# Patient Record
Sex: MALE | Race: WHITE | NOT HISPANIC OR LATINO | Employment: UNEMPLOYED | URBAN - METROPOLITAN AREA
[De-identification: names, ages, dates, MRNs, and addresses within clinical notes are randomized per-mention and may not be internally consistent; named-entity substitution may affect disease eponyms.]

---

## 2018-05-23 ENCOUNTER — OFFICE VISIT (OUTPATIENT)
Dept: PULMONOLOGY | Facility: CLINIC | Age: 48
End: 2018-05-23

## 2018-05-23 VITALS
HEIGHT: 68 IN | TEMPERATURE: 98.1 F | BODY MASS INDEX: 38.1 KG/M2 | SYSTOLIC BLOOD PRESSURE: 140 MMHG | RESPIRATION RATE: 18 BRPM | DIASTOLIC BLOOD PRESSURE: 90 MMHG | HEART RATE: 76 BPM | OXYGEN SATURATION: 96 % | WEIGHT: 251.38 LBS

## 2018-05-23 DIAGNOSIS — R91.8 PULMONARY NODULES: ICD-10-CM

## 2018-05-23 DIAGNOSIS — R06.02 SOB (SHORTNESS OF BREATH): Primary | ICD-10-CM

## 2018-05-23 DIAGNOSIS — J43.2 CENTRILOBULAR EMPHYSEMA (HCC): ICD-10-CM

## 2018-05-23 PROCEDURE — 99205 OFFICE O/P NEW HI 60 MIN: CPT | Performed by: INTERNAL MEDICINE

## 2018-05-23 PROCEDURE — 94729 DIFFUSING CAPACITY: CPT | Performed by: INTERNAL MEDICINE

## 2018-05-23 PROCEDURE — 94060 EVALUATION OF WHEEZING: CPT | Performed by: INTERNAL MEDICINE

## 2018-05-23 PROCEDURE — 94726 PLETHYSMOGRAPHY LUNG VOLUMES: CPT | Performed by: INTERNAL MEDICINE

## 2018-05-23 RX ORDER — ATORVASTATIN CALCIUM 20 MG/1
20 TABLET, FILM COATED ORAL DAILY
COMMUNITY
Start: 2018-03-20

## 2018-05-23 RX ORDER — ALBUTEROL SULFATE 90 UG/1
1 AEROSOL, METERED RESPIRATORY (INHALATION) EVERY 4 HOURS PRN
COMMUNITY
Start: 2018-03-20 | End: 2018-10-25 | Stop reason: SDUPTHER

## 2018-05-23 RX ORDER — ALBUTEROL SULFATE 90 UG/1
4 AEROSOL, METERED RESPIRATORY (INHALATION) ONCE
Status: COMPLETED | OUTPATIENT
Start: 2018-05-23 | End: 2018-05-23

## 2018-05-23 RX ORDER — FUROSEMIDE 20 MG/1
20 TABLET ORAL 2 TIMES DAILY PRN
COMMUNITY
Start: 2018-03-20

## 2018-05-23 RX ADMIN — ALBUTEROL SULFATE 4 PUFF: 90 AEROSOL, METERED RESPIRATORY (INHALATION) at 12:57

## 2018-10-25 ENCOUNTER — OFFICE VISIT (OUTPATIENT)
Dept: PULMONOLOGY | Facility: CLINIC | Age: 48
End: 2018-10-25

## 2018-10-25 VITALS
BODY MASS INDEX: 37.93 KG/M2 | SYSTOLIC BLOOD PRESSURE: 122 MMHG | DIASTOLIC BLOOD PRESSURE: 76 MMHG | RESPIRATION RATE: 18 BRPM | HEART RATE: 92 BPM | OXYGEN SATURATION: 95 % | HEIGHT: 68 IN | TEMPERATURE: 98.2 F | WEIGHT: 250.25 LBS

## 2018-10-25 DIAGNOSIS — R91.8 MULTIPLE PULMONARY NODULES: Primary | ICD-10-CM

## 2018-10-25 DIAGNOSIS — Z23 IMMUNIZATION DUE: ICD-10-CM

## 2018-10-25 PROCEDURE — 90471 IMMUNIZATION ADMIN: CPT | Performed by: INTERNAL MEDICINE

## 2018-10-25 PROCEDURE — 90674 CCIIV4 VAC NO PRSV 0.5 ML IM: CPT | Performed by: INTERNAL MEDICINE

## 2018-10-25 PROCEDURE — 99214 OFFICE O/P EST MOD 30 MIN: CPT | Performed by: INTERNAL MEDICINE

## 2018-10-25 RX ORDER — ALBUTEROL SULFATE 90 UG/1
2 AEROSOL, METERED RESPIRATORY (INHALATION) EVERY 4 HOURS PRN
Qty: 1 INHALER | Refills: 11 | Status: SHIPPED | OUTPATIENT
Start: 2018-10-25 | End: 2019-04-11 | Stop reason: SDUPTHER

## 2018-10-25 RX ORDER — TRAMADOL HYDROCHLORIDE 50 MG/1
50 TABLET ORAL EVERY 8 HOURS PRN
COMMUNITY
Start: 2018-08-27

## 2018-10-25 NOTE — PROGRESS NOTES
CC:    Lung nodules / COPD    HPI:    47 y/o WM w/ h/o Tobacco abuse 52 py - quit 2017, COPD, PEDRO not started on CPAP yet, who had a near syncopal episode dyspnea in November 2017.  He had a cardiac evaluation and CT PE protocol.  CT revealed evidence of granulomatous disease with several scattered sub-centimeter nodules.  He also had a sleep study but hasn't started CPAP yet.  He does complain of FERNANDO with moderate activity.  Not much improvement with Anoro or now currently Breo.  No h/o Asthma or allergies.  Cardiac evaluation was reportedly ok.  No other complaints.    INTERVAL HISTORY:    Patient returns for follow up.  Doing well.  He is taking Breo. Didn't get the stiolto.  Rarely has to use rescue inhaler.  Still tobacco free.  Using CPAP.    PMH:    Past Medical History:   Diagnosis Date   • COPD (chronic obstructive pulmonary disease) (CMS/HCC)    • Sleep apnea    • Syncope    • Tachycardia      PSH:    Past Surgical History:   Procedure Laterality Date   • AVULSION TOENAIL PLATE     • CARPAL TUNNEL RELEASE     • PLANTAR FASCIA SURGERY       FH:    Family History   Problem Relation Age of Onset   • Diabetes Mother    • COPD Father    • Diabetes Father    • Heart disease Brother    • Heart disease Maternal Grandmother    • Cancer Maternal Grandfather      SH:    Social History     Social History   • Marital status:      Spouse name: N/A   • Number of children: N/A   • Years of education: N/A     Occupational History   • Not on file.     Social History Main Topics   • Smoking status: Former Smoker     Quit date: 11/23/2017   • Smokeless tobacco: Never Used   • Alcohol use 0.6 oz/week     1 Cans of beer per week      Comment: occ   • Drug use: No   • Sexual activity: Defer     Other Topics Concern   • Not on file     Social History Narrative   • No narrative on file     ALLERGIES:    Allergies   Allergen Reactions   • Penicillins Unknown (See Comments)     Not sure     MEDICATIONS:      Current  Outpatient Prescriptions:   •  atorvastatin (LIPITOR) 20 MG tablet, Take 20 mg by mouth Daily., Disp: , Rfl:   •  BREO ELLIPTA 100-25 MCG/INH aerosol powder , Inhale 1 puff Daily., Disp: , Rfl:   •  furosemide (LASIX) 20 MG tablet, Take 20 mg by mouth 2 (Two) Times a Day As Needed., Disp: , Rfl:   •  metoprolol tartrate (LOPRESSOR) 25 MG tablet, Take 25 mg by mouth Every 12 (Twelve) Hours., Disp: , Rfl:   •  tiotropium bromide-olodaterol (STIOLTO RESPIMAT) 2.5-2.5 MCG/ACT aerosol solution inhaler, Inhale 2 puffs Daily., Disp: 1 inhaler, Rfl: 11  •  traMADol (ULTRAM) 50 MG tablet, Take 50 mg by mouth Every 8 (Eight) Hours As Needed., Disp: , Rfl:   •  VENTOLIN  (90 Base) MCG/ACT inhaler, Inhale 1 puff Every 4 (Four) Hours As Needed., Disp: , Rfl:   ROS:  Per HPI, otherwise all systems reviewed and negative.    DIAGNOSTIC DATA (Reviewed and interpreted by me unless otherwise specified):    CT Chest 3/2018 - emphysema, scattered sub-centimeter nodules, larges of which was 7 mm, unchanged from 11/2017.  Appearance most likely c/w histo.    PFT 5/23/18 - moderate obstruction, no change w/ BD, mod reduction in DLCO partially corrects    Vitals:    10/25/18 1203   BP: 122/76   Pulse: 92   Resp: 18   Temp: 98.2 °F (36.8 °C)   SpO2: 95%       Physical Exam   Constitutional: Oriented to person, place, and time. Appears well-developed and well-nourished.   Head: Normocephalic and atraumatic.   Nose: Nose normal.   Mouth/Throat: Oropharynx is clear and moist.   Eyes: Conjunctivae are normal.  Pupils normal.  Neck: No tracheal deviation present.   Cardiovascular: Normal rate, regular rhythm, normal heart sounds and intact distal pulses.  Exam reveals no gallop and no friction rub.  No thrill.  No JVD.  No edema.  No murmur heard.  Pulmonary/Chest: Effort normal and breath sounds normal.  No tenderness to palpation.  No clubbing.   Abdominal: Soft. Bowel sounds are normal. No distension. No tenderness. There is no  guarding.   Musculoskeletal: Normal range of motion.  No tenderness.  Lymphadenopathy:  No cervical adenopathy.   Neurological:  No new focal neurological deficits observed   Skin: Skin is warm and dry. No rash noted.   Psychiatric: Normal mood and affect.  Behavior is normal. Judgment normal.    Assessment/Plan     1)  GOLD 2A COPD - get rid of Breo as he doesn't need ICS.  Will give sample of Anoro and Stiolto.  He can use whichever he likes best or is cheapest.  Will put in prescription for both. Prn DAVID.  Already quit smoking.  Encouraged weight loss.    2)  Lung Nodules - repeat CT in 3/2019    RTC 6 months    Denilson Randle MD  Pulmonology and Critical Care Medicine  10/25/18 3:34 PM  Electronically Signed    C.C.:  No ref. provider found, Ronal Joe APRN

## 2019-03-11 ENCOUNTER — HOSPITAL ENCOUNTER (OUTPATIENT)
Dept: CT IMAGING | Facility: HOSPITAL | Age: 49
Discharge: HOME | End: 2019-03-11
Admitting: INTERNAL MEDICINE

## 2019-03-11 DIAGNOSIS — R91.8 MULTIPLE PULMONARY NODULES: ICD-10-CM

## 2019-03-11 PROCEDURE — 71250 CT THORAX DX C-: CPT

## 2019-04-11 ENCOUNTER — OFFICE VISIT (OUTPATIENT)
Dept: PULMONOLOGY | Facility: CLINIC | Age: 49
End: 2019-04-11

## 2019-04-11 VITALS
DIASTOLIC BLOOD PRESSURE: 90 MMHG | RESPIRATION RATE: 19 BRPM | HEIGHT: 68 IN | TEMPERATURE: 98.2 F | WEIGHT: 254 LBS | BODY MASS INDEX: 38.49 KG/M2 | SYSTOLIC BLOOD PRESSURE: 128 MMHG | OXYGEN SATURATION: 96 % | HEART RATE: 99 BPM

## 2019-04-11 DIAGNOSIS — R91.1 LUNG NODULE: Primary | ICD-10-CM

## 2019-04-11 PROCEDURE — 99214 OFFICE O/P EST MOD 30 MIN: CPT | Performed by: INTERNAL MEDICINE

## 2019-04-11 RX ORDER — ALBUTEROL SULFATE 90 UG/1
2 AEROSOL, METERED RESPIRATORY (INHALATION) EVERY 4 HOURS PRN
Qty: 1 INHALER | Refills: 11 | Status: SHIPPED | OUTPATIENT
Start: 2019-04-11

## 2019-04-11 RX ORDER — UBIDECARENONE 100 MG
100 CAPSULE ORAL DAILY
COMMUNITY

## 2019-04-11 NOTE — PROGRESS NOTES
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  He had a cardiac evaluation and CT PE protocol.  CT revealed evidence of granulomatous disease with several scattered sub-centimeter nodules.  He also had a sleep study but hasn't started CPAP yet.  He does complain of FERNANDO with moderate activity.  Not much improvement with Anoro or now currently Breo.  No h/o Asthma or allergies.  Cardiac evaluation was reportedly ok.  No other complaints.    INTERVAL HISTORY:    Patient returns for follow up.  Doing well.  He is taking Stiolto and doing fine with it.  Rarely has to use rescue inhaler.  Still tobacco free.  Using CPAP.    PMH:    Past Medical History:   Diagnosis Date   • COPD (chronic obstructive pulmonary disease) (CMS/HCC)    • Sleep apnea    • Syncope    • Tachycardia      PSH:    Past Surgical History:   Procedure Laterality Date   • AVULSION TOENAIL PLATE     • CARPAL TUNNEL RELEASE     • PLANTAR FASCIA SURGERY       FH:    Family History   Problem Relation Age of Onset   • Diabetes Mother    • COPD Father    • Diabetes Father    • Heart disease Brother    • Heart disease Maternal Grandmother    • Cancer Maternal Grandfather      SH:    Social History     Socioeconomic History   • Marital status:      Spouse name: Not on file   • Number of children: Not on file   • Years of education: Not on file   • Highest education level: Not on file   Tobacco Use   • Smoking status: Former Smoker     Last attempt to quit: 2017     Years since quittin.3   • Smokeless tobacco: Never Used   Substance and Sexual Activity   • Alcohol use: Yes     Alcohol/week: 0.6 oz     Types: 1 Cans of beer per week     Comment: occ   • Drug use: No   • Sexual activity: Defer     ALLERGIES:    Allergies   Allergen Reactions   • Penicillins Unknown (See Comments)     Not sure      MEDICATIONS:      Current Outpatient Medications:   •  atorvastatin (LIPITOR) 20 MG tablet, Take 20 mg by mouth Daily., Disp: , Rfl:   •  coenzyme Q10 100 MG capsule, Take 100 mg by mouth Daily., Disp: , Rfl:   •  furosemide (LASIX) 20 MG tablet, Take 20 mg by mouth 2 (Two) Times a Day As Needed., Disp: , Rfl:   •  metoprolol tartrate (LOPRESSOR) 25 MG tablet, Take 25 mg by mouth Every 12 (Twelve) Hours., Disp: , Rfl:   •  tiotropium bromide-olodaterol (STIOLTO RESPIMAT) 2.5-2.5 MCG/ACT aerosol solution inhaler, Inhale 2 puffs Daily., Disp: 1 inhaler, Rfl: 11  •  traMADol (ULTRAM) 50 MG tablet, Take 50 mg by mouth Every 8 (Eight) Hours As Needed., Disp: , Rfl:   •  VENTOLIN  (90 Base) MCG/ACT inhaler, Inhale 2 puffs Every 4 (Four) Hours As Needed for Wheezing or Shortness of Air., Disp: 1 inhaler, Rfl: 11  ROS:  Per HPI, otherwise all systems reviewed and negative.    DIAGNOSTIC DATA (Reviewed and interpreted by me unless otherwise specified):    CT Chest 3/2018 - emphysema, scattered sub-centimeter nodules, larges of which was 7 mm, unchanged from 11/2017.  Appearance most likely c/w histo.    CT Chest 3/11/19 - emphysema, scattered sub-centimeter nodules, larges of which was 7 mm, unchanged from 11/2017.  Appearance most likely c/w histo.    PFT 5/23/18 - moderate obstruction, no change w/ BD, mod reduction in DLCO partially corrects    Vitals:    04/11/19 1038   BP: 128/90   Pulse: 99   Resp: 19   Temp: 98.2 °F (36.8 °C)   SpO2: 96%       Physical Exam   Constitutional: Oriented to person, place, and time. Appears well-developed and well-nourished.   Head: Normocephalic and atraumatic.   Nose: Nose normal.   Mouth/Throat: Oropharynx is clear and moist.   Eyes: Conjunctivae are normal.  Pupils normal.  Neck: No tracheal deviation present.   Cardiovascular: Normal rate, regular rhythm, normal heart sounds and intact distal pulses.  Exam reveals no gallop and no friction rub.  No thrill.  No JVD.  No  edema.  No murmur heard.  Pulmonary/Chest: Effort normal and breath sounds normal.  No tenderness to palpation.  No clubbing.   Abdominal: Soft. Bowel sounds are normal. No distension. No tenderness. There is no guarding.   Musculoskeletal: Normal range of motion.  No tenderness.  Lymphadenopathy:  No cervical adenopathy.   Neurological:  No new focal neurological deficits observed   Skin: Skin is warm and dry. No rash noted.   Psychiatric: Normal mood and affect.  Behavior is normal. Judgment normal.    Assessment/Plan     1)  GOLD 2A COPD - Continue stiolto.  Will put in prescription for both. Prn DAVID.  Already quit smoking.      2)  Lung Nodules - repeat CT in 3/2019.  If stable no more CT scans until eligible for LDCT for lung cancer screening at age 53.    RTC 1 year    Denilson Randle MD  Pulmonology and Critical Care Medicine  04/11/19 11:06 AM  Electronically Signed    C.C.:  WILBER King, Ronal Joe APRN

## 2020-02-20 ENCOUNTER — HOSPITAL ENCOUNTER (OUTPATIENT)
Dept: CT IMAGING | Facility: HOSPITAL | Age: 50
Discharge: HOME | End: 2020-02-20
Admitting: INTERNAL MEDICINE

## 2020-02-20 DIAGNOSIS — R91.1 LUNG NODULE: ICD-10-CM

## 2020-02-20 PROCEDURE — 71250 CT THORAX DX C-: CPT

## 2024-08-15 ENCOUNTER — HOSPITAL ENCOUNTER (INPATIENT)
Facility: HOSPITAL | Age: 54
LOS: 3 days | Discharge: HOME | DRG: 390 | End: 2024-08-18
Attending: STUDENT IN AN ORGANIZED HEALTH CARE EDUCATION/TRAINING PROGRAM | Admitting: INTERNAL MEDICINE
Payer: MEDICAID

## 2024-08-15 ENCOUNTER — APPOINTMENT (OUTPATIENT)
Facility: HOSPITAL | Age: 54
DRG: 390 | End: 2024-08-15
Payer: MEDICAID

## 2024-08-15 DIAGNOSIS — R10.9 ABDOMINAL PAIN, UNSPECIFIED ABDOMINAL LOCATION: ICD-10-CM

## 2024-08-15 DIAGNOSIS — R91.1 PULMONARY NODULE: Primary | ICD-10-CM

## 2024-08-15 PROBLEM — E66.09 CLASS 2 OBESITY DUE TO EXCESS CALORIES WITH BODY MASS INDEX (BMI) OF 36.0 TO 36.9 IN ADULT: Status: ACTIVE | Noted: 2024-08-15

## 2024-08-15 PROBLEM — J44.9 COPD (CHRONIC OBSTRUCTIVE PULMONARY DISEASE): Status: ACTIVE | Noted: 2024-08-15

## 2024-08-15 PROBLEM — E66.812 CLASS 2 OBESITY DUE TO EXCESS CALORIES WITH BODY MASS INDEX (BMI) OF 36.0 TO 36.9 IN ADULT: Status: ACTIVE | Noted: 2024-08-15

## 2024-08-15 PROBLEM — K56.7 ILEUS: Status: ACTIVE | Noted: 2024-08-15

## 2024-08-15 PROBLEM — G47.33 OSA (OBSTRUCTIVE SLEEP APNEA): Status: ACTIVE | Noted: 2024-08-15

## 2024-08-15 LAB
ALBUMIN SERPL-MCNC: 4.4 G/DL (ref 3.5–5.2)
ALBUMIN/GLOB SERPL: 1.3 G/DL
ALP SERPL-CCNC: 69 U/L (ref 39–117)
ALT SERPL W P-5'-P-CCNC: 20 U/L (ref 1–41)
ANION GAP SERPL CALCULATED.3IONS-SCNC: 10.4 MMOL/L (ref 5–15)
AST SERPL-CCNC: 31 U/L (ref 1–40)
BASOPHILS # BLD AUTO: 0.04 10*3/MM3 (ref 0–0.2)
BASOPHILS NFR BLD AUTO: 0.4 % (ref 0–1.5)
BILIRUB SERPL-MCNC: 1.8 MG/DL (ref 0–1.2)
BILIRUB UR QL STRIP: ABNORMAL
BUN SERPL-MCNC: 13 MG/DL (ref 6–20)
BUN/CREAT SERPL: 13 (ref 7–25)
CALCIUM SPEC-SCNC: 9.2 MG/DL (ref 8.6–10.5)
CHLORIDE SERPL-SCNC: 101 MMOL/L (ref 98–107)
CLARITY UR: CLEAR
CO2 SERPL-SCNC: 25.6 MMOL/L (ref 22–29)
COLOR UR: ABNORMAL
CREAT SERPL-MCNC: 1 MG/DL (ref 0.76–1.27)
DEPRECATED RDW RBC AUTO: 42.4 FL (ref 37–54)
EGFRCR SERPLBLD CKD-EPI 2021: 88.3 ML/MIN/1.73
EOSINOPHIL # BLD AUTO: 0.16 10*3/MM3 (ref 0–0.4)
EOSINOPHIL NFR BLD AUTO: 1.6 % (ref 0.3–6.2)
ERYTHROCYTE [DISTWIDTH] IN BLOOD BY AUTOMATED COUNT: 12.4 % (ref 12.3–15.4)
GLOBULIN UR ELPH-MCNC: 3.5 GM/DL
GLUCOSE SERPL-MCNC: 110 MG/DL (ref 65–99)
GLUCOSE UR STRIP-MCNC: NEGATIVE MG/DL
HCT VFR BLD AUTO: 47.3 % (ref 37.5–51)
HGB BLD-MCNC: 16.2 G/DL (ref 13–17.7)
HGB UR QL STRIP.AUTO: NEGATIVE
HOLD SPECIMEN: NORMAL
IMM GRANULOCYTES # BLD AUTO: 0.02 10*3/MM3 (ref 0–0.05)
IMM GRANULOCYTES NFR BLD AUTO: 0.2 % (ref 0–0.5)
KETONES UR QL STRIP: ABNORMAL
LEUKOCYTE ESTERASE UR QL STRIP.AUTO: NEGATIVE
LIPASE SERPL-CCNC: 22 U/L (ref 13–60)
LYMPHOCYTES # BLD AUTO: 1.46 10*3/MM3 (ref 0.7–3.1)
LYMPHOCYTES NFR BLD AUTO: 14.2 % (ref 19.6–45.3)
MCH RBC QN AUTO: 31.2 PG (ref 26.6–33)
MCHC RBC AUTO-ENTMCNC: 34.2 G/DL (ref 31.5–35.7)
MCV RBC AUTO: 91.1 FL (ref 79–97)
MONOCYTES # BLD AUTO: 0.71 10*3/MM3 (ref 0.1–0.9)
MONOCYTES NFR BLD AUTO: 6.9 % (ref 5–12)
NEUTROPHILS NFR BLD AUTO: 7.87 10*3/MM3 (ref 1.7–7)
NEUTROPHILS NFR BLD AUTO: 76.7 % (ref 42.7–76)
NITRITE UR QL STRIP: NEGATIVE
PH UR STRIP.AUTO: 6 [PH] (ref 5–8)
PLATELET # BLD AUTO: 218 10*3/MM3 (ref 140–450)
PMV BLD AUTO: 11.2 FL (ref 6–12)
POTASSIUM SERPL-SCNC: 3.9 MMOL/L (ref 3.5–5.2)
PROT SERPL-MCNC: 7.9 G/DL (ref 6–8.5)
PROT UR QL STRIP: ABNORMAL
RBC # BLD AUTO: 5.19 10*6/MM3 (ref 4.14–5.8)
SODIUM SERPL-SCNC: 137 MMOL/L (ref 136–145)
SP GR UR STRIP: >=1.03 (ref 1–1.03)
TROPONIN T SERPL HS-MCNC: 14 NG/L
UROBILINOGEN UR QL STRIP: ABNORMAL
WBC NRBC COR # BLD AUTO: 10.26 10*3/MM3 (ref 3.4–10.8)
WHOLE BLOOD HOLD COAG: NORMAL
WHOLE BLOOD HOLD SPECIMEN: NORMAL

## 2024-08-15 PROCEDURE — 25010000002 ONDANSETRON PER 1 MG: Performed by: STUDENT IN AN ORGANIZED HEALTH CARE EDUCATION/TRAINING PROGRAM

## 2024-08-15 PROCEDURE — 25510000001 IOPAMIDOL 61 % SOLUTION: Performed by: STUDENT IN AN ORGANIZED HEALTH CARE EDUCATION/TRAINING PROGRAM

## 2024-08-15 PROCEDURE — 99285 EMERGENCY DEPT VISIT HI MDM: CPT

## 2024-08-15 PROCEDURE — 85025 COMPLETE CBC W/AUTO DIFF WBC: CPT | Performed by: STUDENT IN AN ORGANIZED HEALTH CARE EDUCATION/TRAINING PROGRAM

## 2024-08-15 PROCEDURE — 81003 URINALYSIS AUTO W/O SCOPE: CPT | Performed by: STUDENT IN AN ORGANIZED HEALTH CARE EDUCATION/TRAINING PROGRAM

## 2024-08-15 PROCEDURE — 71045 X-RAY EXAM CHEST 1 VIEW: CPT

## 2024-08-15 PROCEDURE — 93005 ELECTROCARDIOGRAM TRACING: CPT | Performed by: STUDENT IN AN ORGANIZED HEALTH CARE EDUCATION/TRAINING PROGRAM

## 2024-08-15 PROCEDURE — 99222 1ST HOSP IP/OBS MODERATE 55: CPT | Performed by: INTERNAL MEDICINE

## 2024-08-15 PROCEDURE — 25810000003 SODIUM CHLORIDE 0.9 % SOLUTION: Performed by: STUDENT IN AN ORGANIZED HEALTH CARE EDUCATION/TRAINING PROGRAM

## 2024-08-15 PROCEDURE — 84484 ASSAY OF TROPONIN QUANT: CPT | Performed by: STUDENT IN AN ORGANIZED HEALTH CARE EDUCATION/TRAINING PROGRAM

## 2024-08-15 PROCEDURE — 74177 CT ABD & PELVIS W/CONTRAST: CPT

## 2024-08-15 PROCEDURE — 83690 ASSAY OF LIPASE: CPT | Performed by: STUDENT IN AN ORGANIZED HEALTH CARE EDUCATION/TRAINING PROGRAM

## 2024-08-15 PROCEDURE — 71275 CT ANGIOGRAPHY CHEST: CPT

## 2024-08-15 PROCEDURE — 80053 COMPREHEN METABOLIC PANEL: CPT | Performed by: STUDENT IN AN ORGANIZED HEALTH CARE EDUCATION/TRAINING PROGRAM

## 2024-08-15 PROCEDURE — 25010000002 MORPHINE PER 10 MG: Performed by: STUDENT IN AN ORGANIZED HEALTH CARE EDUCATION/TRAINING PROGRAM

## 2024-08-15 PROCEDURE — 25010000002 MORPHINE PER 10 MG: Performed by: INTERNAL MEDICINE

## 2024-08-15 RX ORDER — HEPARIN SODIUM 5000 [USP'U]/ML
5000 INJECTION, SOLUTION INTRAVENOUS; SUBCUTANEOUS EVERY 8 HOURS SCHEDULED
Status: DISCONTINUED | OUTPATIENT
Start: 2024-08-15 | End: 2024-08-18 | Stop reason: HOSPADM

## 2024-08-15 RX ORDER — MORPHINE SULFATE 2 MG/ML
2 INJECTION, SOLUTION INTRAMUSCULAR; INTRAVENOUS
Status: DISCONTINUED | OUTPATIENT
Start: 2024-08-15 | End: 2024-08-18 | Stop reason: HOSPADM

## 2024-08-15 RX ORDER — IPRATROPIUM BROMIDE AND ALBUTEROL SULFATE 2.5; .5 MG/3ML; MG/3ML
3 SOLUTION RESPIRATORY (INHALATION) EVERY 4 HOURS PRN
Status: DISCONTINUED | OUTPATIENT
Start: 2024-08-15 | End: 2024-08-18 | Stop reason: HOSPADM

## 2024-08-15 RX ORDER — SODIUM CHLORIDE 0.9 % (FLUSH) 0.9 %
10 SYRINGE (ML) INJECTION AS NEEDED
Status: DISCONTINUED | OUTPATIENT
Start: 2024-08-15 | End: 2024-08-18 | Stop reason: HOSPADM

## 2024-08-15 RX ORDER — DEXTROSE MONOHYDRATE, SODIUM CHLORIDE, AND POTASSIUM CHLORIDE 50; 1.49; 4.5 G/1000ML; G/1000ML; G/1000ML
100 INJECTION, SOLUTION INTRAVENOUS CONTINUOUS
Status: DISCONTINUED | OUTPATIENT
Start: 2024-08-15 | End: 2024-08-18 | Stop reason: HOSPADM

## 2024-08-15 RX ORDER — ONDANSETRON 2 MG/ML
4 INJECTION INTRAMUSCULAR; INTRAVENOUS ONCE
Status: COMPLETED | OUTPATIENT
Start: 2024-08-15 | End: 2024-08-15

## 2024-08-15 RX ORDER — ONDANSETRON 2 MG/ML
4 INJECTION INTRAMUSCULAR; INTRAVENOUS EVERY 6 HOURS PRN
Status: DISCONTINUED | OUTPATIENT
Start: 2024-08-15 | End: 2024-08-18 | Stop reason: HOSPADM

## 2024-08-15 RX ADMIN — METOPROLOL TARTRATE 25 MG: 25 TABLET, FILM COATED ORAL at 22:05

## 2024-08-15 RX ADMIN — ONDANSETRON 4 MG: 2 INJECTION INTRAMUSCULAR; INTRAVENOUS at 10:07

## 2024-08-15 RX ADMIN — IOPAMIDOL 90 ML: 612 INJECTION, SOLUTION INTRAVENOUS at 11:04

## 2024-08-15 RX ADMIN — MORPHINE SULFATE 2 MG: 2 INJECTION, SOLUTION INTRAMUSCULAR; INTRAVENOUS at 20:24

## 2024-08-15 RX ADMIN — POTASSIUM CHLORIDE, DEXTROSE MONOHYDRATE AND SODIUM CHLORIDE 100 ML/HR: 150; 5; 450 INJECTION, SOLUTION INTRAVENOUS at 20:03

## 2024-08-15 RX ADMIN — ONDANSETRON 4 MG: 2 INJECTION INTRAMUSCULAR; INTRAVENOUS at 18:37

## 2024-08-15 RX ADMIN — SODIUM CHLORIDE 500 ML: 9 INJECTION, SOLUTION INTRAVENOUS at 10:07

## 2024-08-15 RX ADMIN — MORPHINE SULFATE 4 MG: 4 INJECTION, SOLUTION INTRAMUSCULAR; INTRAVENOUS at 10:09

## 2024-08-15 NOTE — PLAN OF CARE
Problem: Adult Inpatient Plan of Care  Goal: Plan of Care Review  Outcome: Ongoing, Progressing  Goal: Patient-Specific Goal (Individualized)  Outcome: Ongoing, Progressing  Goal: Absence of Hospital-Acquired Illness or Injury  Outcome: Ongoing, Progressing  Goal: Optimal Comfort and Wellbeing  Outcome: Ongoing, Progressing  Goal: Readiness for Transition of Care  Outcome: Ongoing, Progressing  Intervention: Mutually Develop Transition Plan  Recent Flowsheet Documentation  Taken 8/15/2024 1810 by Radha Hammond RN  Equipment Currently Used at Home: none  Transportation Anticipated: family or friend will provide  Patient/Family Anticipated Services at Transition: none  Patient/Family Anticipates Transition to: home with family     Problem: Pain Acute  Goal: Acceptable Pain Control and Functional Ability  Outcome: Ongoing, Progressing     Problem: Infection  Goal: Absence of Infection Signs and Symptoms  Outcome: Ongoing, Progressing   Goal Outcome Evaluation:

## 2024-08-15 NOTE — ED NOTES
Francisco Javier Ortega    Nursing Report ED to Floor:  Mental status: A&O x4, GCS 15  Ambulatory status: WNL  Oxygen Therapy:  2L NC for comfort DT distended belly  Cardiac Rhythm: SR-ST  Admitted from: Home, ED [facility]  Safety Concerns:  NA  Social Issues: NA  ED Room #:  5    ED Nurse Phone [id number] [phone] or may call [phone].      HPI:   Chief Complaint   Patient presents with    Abdominal Pain       Past Medical History:  Past Medical History:   Diagnosis Date    COPD (chronic obstructive pulmonary disease)     Sleep apnea     Syncope     Tachycardia         Past Surgical History:  Past Surgical History:   Procedure Laterality Date    AVULSION TOENAIL PLATE      CARPAL TUNNEL RELEASE      PLANTAR FASCIA SURGERY          Admitting Doctor:   No admitting provider for patient encounter.    Consulting Provider(s):  Consults       No orders found from 7/17/2024 to 8/16/2024.             Admitting Diagnosis:   The encounter diagnosis was Abdominal pain, unspecified abdominal location.    Most Recent Vitals:   Vitals:    08/15/24 1230 08/15/24 1400 08/15/24 1430 08/15/24 1510   BP: 124/86 128/92 126/83    BP Location:       Patient Position:       Pulse: 81 87 84 82   Resp:       Temp:       TempSrc:       SpO2: 98% 96% 96% 95%   Weight:       Height:           Active LDAs/IV Access:   Lines, Drains & Airways       Active LDAs       Name Placement date Placement time Site Days    Peripheral IV 08/15/24 0953 Right Antecubital 08/15/24  0953  Antecubital  less than 1                    Labs (abnormal labs have a star):   Labs Reviewed   COMPREHENSIVE METABOLIC PANEL - Abnormal; Notable for the following components:       Result Value    Glucose 110 (*)     Total Bilirubin 1.8 (*)     All other components within normal limits    Narrative:     GFR Normal >60  Chronic Kidney Disease <60  Kidney Failure <15     URINALYSIS W/ MICROSCOPIC IF INDICATED (NO CULTURE) - Abnormal; Notable for the following components:    Color, UA  Dark Yellow (*)     Ketones, UA 15 mg/dL (1+) (*)     Bilirubin, UA Moderate (2+) (*)     Protein, UA Trace (*)     Urobilinogen, UA 4.0 E.U./dL (*)     All other components within normal limits    Narrative:     Urine microscopic not indicated.   CBC WITH AUTO DIFFERENTIAL - Abnormal; Notable for the following components:    Neutrophil % 76.7 (*)     Lymphocyte % 14.2 (*)     Neutrophils, Absolute 7.87 (*)     All other components within normal limits   LIPASE - Normal   SINGLE HS TROPONIN T - Normal   RAINBOW DRAW    Narrative:     The following orders were created for panel order [place] Draw.  Procedure                               Abnormality         Status                     ---------                               -----------         ------                     Green Top (Gel)[[id number]]                                  Final result               Lavender Top[[id number]]                                     Final result               Gold Top - SST[[id number]]                                   Final result               [place] Top[[id number]]                                         Final result               Light Blue Top[[id number]]                                   Final result                 Please view results for these tests on the individual orders.   CBC AND DIFFERENTIAL    Narrative:     The following orders were created for panel order CBC & Differential.  Procedure                               Abnormality         Status                     ---------                               -----------         ------                     CBC Auto Differential[[id number]]        Abnormal            Final result                 Please view results for these tests on the individual orders.   GREEN TOP   LAVENDER TOP   GOLD TOP - SST   GRAY TOP   LIGHT BLUE TOP       Meds Given in ED:   Medications   sodium chloride 0.9 % flush 10 mL (has no administration in time range)   sodium chloride 0.9 % bolus 500 mL (0 mL  Intravenous Stopped 8/15/24 1053)   ondansetron (ZOFRAN) injection 4 mg (4 mg Intravenous Given 8/15/24 1007)   morphine injection 4 mg (4 mg Intravenous Given 8/15/24 1009)   iopamidol (ISOVUE-300) 61 % injection 100 mL (90 mL Intravenous Given 8/15/24 1104)           Last NIH score:                                                          Dysphagia screening results:        [place] Coma Scale:  No data recorded     CIWA:        Restraint Type:            Isolation Status:  No active isolations

## 2024-08-15 NOTE — FSED PROVIDER NOTE
"Subjective  History of Present Illness:    54 year old male hx of COPD who presents with epigastric abdominal pain x 3 days. He states he had NVD two days ago but that has resolved. He is currently on mounjara. He had chinese food a few days ago and is unsure if he got food poisoning. He denies fevers, hx of PUD, hx of CAD      Nurses Notes reviewed and agree, including vitals, allergies, social history and prior medical history.     REVIEW OF SYSTEMS: All systems reviewed and not pertinent unless noted.  Review of Systems   All other systems reviewed and are negative.      Past Medical History:   Diagnosis Date    COPD (chronic obstructive pulmonary disease)     Sleep apnea     Syncope     Tachycardia        Allergies:    Penicillins      Past Surgical History:   Procedure Laterality Date    AVULSION TOENAIL PLATE      CARPAL TUNNEL RELEASE      PLANTAR FASCIA SURGERY           Social History     Socioeconomic History    Marital status:    Tobacco Use    Smoking status: Former     Current packs/day: 0.00     Types: Cigarettes     Quit date: 2017     Years since quittin.7    Smokeless tobacco: Never   Substance and Sexual Activity    Alcohol use: Yes     Alcohol/week: 1.0 standard drink of alcohol     Types: 1 Cans of beer per week     Comment: occ    Drug use: No    Sexual activity: Defer         Family History   Problem Relation Age of Onset    Diabetes Mother     COPD Father     Diabetes Father     Heart disease Brother     Heart disease Maternal Grandmother     Cancer Maternal Grandfather        Objective  Physical Exam:  /86   Pulse 81   Temp 98 °F (36.7 °C) (Oral)   Resp 16   Ht 172.7 cm (68\")   Wt 110 kg (243 lb)   SpO2 98%   BMI 36.95 kg/m²      Physical Exam  Constitutional:       Appearance: Normal appearance.   HENT:      Head: Normocephalic and atraumatic.      Nose: Nose normal.      Mouth/Throat:      Mouth: Mucous membranes are moist.   Eyes:      Extraocular Movements: " Extraocular movements intact.      Conjunctiva/sclera: Conjunctivae normal.   Cardiovascular:      Rate and Rhythm: Normal rate and regular rhythm.   Pulmonary:      Effort: Pulmonary effort is normal. No respiratory distress.   Abdominal:      General: Abdomen is flat. There is no distension.      Palpations: Abdomen is soft.      Tenderness: There is no abdominal tenderness. There is no guarding or rebound.      Comments: Atraumatic    Musculoskeletal:         General: Normal range of motion.      Cervical back: Normal range of motion and neck supple.   Skin:     General: Skin is warm and dry.   Neurological:      General: No focal deficit present.      Mental Status: He is alert.      Comments: Moving all extremities spontaneously    Psychiatric:         Mood and Affect: Mood normal.         Behavior: Behavior normal.         Procedures    ED Course:         Lab Results (last 24 hours)       Procedure Component Value Units Date/Time    CBC & Differential [[id number]]  (Abnormal) Collected: 08/15/24 0948    Specimen: Blood Updated: 08/15/24 1002    Narrative:      The following orders were created for panel order CBC & Differential.  Procedure                               Abnormality         Status                     ---------                               -----------         ------                     CBC Auto Differential[[id number]]        Abnormal            Final result                 Please view results for these tests on the individual orders.    Comprehensive Metabolic Panel [[id number]]  (Abnormal) Collected: 08/15/24 0948    Specimen: Blood Updated: 08/15/24 1015     Glucose 110 mg/dL      BUN 13 mg/dL      Creatinine 1.00 mg/dL      Sodium 137 mmol/L      Potassium 3.9 mmol/L      Chloride 101 mmol/L      CO2 25.6 mmol/L      Calcium 9.2 mg/dL      Total Protein 7.9 g/dL      Albumin 4.4 g/dL      ALT (SGPT) 20 U/L      AST (SGOT) 31 U/L      Alkaline Phosphatase 69 U/L      Total Bilirubin 1.8 mg/dL       Globulin 3.5 gm/dL      A/G Ratio 1.3 g/dL      BUN/Creatinine Ratio 13.0     Anion Gap 10.4 mmol/L      eGFR 88.3 mL/min/1.73     Narrative:      GFR Normal >60  Chronic Kidney Disease <60  Kidney Failure <15      Lipase [[id number]]  (Normal) Collected: 08/15/24 0948    Specimen: Blood Updated: 08/15/24 1015     Lipase 22 U/L     Single High Sensitivity Troponin T [[id number]]  (Normal) Collected: 08/15/24 0948    Specimen: Blood Updated: 08/15/24 1012     HS Troponin T 14 ng/L     CBC Auto Differential [[id number]]  (Abnormal) Collected: 08/15/24 0948    Specimen: Blood Updated: 08/15/24 1002     WBC 10.26 10*3/mm3      RBC 5.19 10*6/mm3      Hemoglobin 16.2 g/dL      Hematocrit 47.3 %      MCV 91.1 fL      MCH 31.2 pg      MCHC 34.2 g/dL      RDW 12.4 %      RDW-SD 42.4 fl      MPV 11.2 fL      Platelets 218 10*3/mm3      Neutrophil % 76.7 %      Lymphocyte % 14.2 %      Monocyte % 6.9 %      Eosinophil % 1.6 %      Basophil % 0.4 %      Immature Grans % 0.2 %      Neutrophils, Absolute 7.87 10*3/mm3      Lymphocytes, Absolute 1.46 10*3/mm3      Monocytes, Absolute 0.71 10*3/mm3      Eosinophils, Absolute 0.16 10*3/mm3      Basophils, Absolute 0.04 10*3/mm3      Immature Grans, Absolute 0.02 10*3/mm3     Urinalysis With Microscopic If Indicated (No Culture) - Urine, Clean Catch [[id number]]  (Abnormal) Collected: 08/15/24 1007    Specimen: Urine, Clean Catch Updated: 08/15/24 1045     Color, UA Dark Yellow     Appearance, UA Clear     pH, UA 6.0     Specific Gravity, UA >=1.030     Glucose, UA Negative     Ketones, UA 15 mg/dL (1+)     Bilirubin, UA Moderate (2+)     Blood, UA Negative     Protein, UA Trace     Leuk Esterase, UA Negative     Nitrite, UA Negative     Urobilinogen, UA 4.0 E.U./dL    Narrative:      Urine microscopic not indicated.             CT Abdomen Pelvis With Contrast    Result Date: 8/15/2024  CT ANGIOGRAM CHEST PULMONARY EMBOLISM, CT ABDOMEN PELVIS W CONTRAST Date of Exam: 8/15/2024 11:04  AM EDT Indication: epigastric abd pain. Comparison: None available. Technique: CTA of the chest was performed after the uneventful intravenous administration of 90 mL Isovue-300. Reconstructed coronal and sagittal images were also obtained. In addition, a 3-D volume rendered image was created for interpretation. Automated exposure control and iterative reconstruction methods were used. Axial CT images were obtained of the abdomen and pelvis following the uneventful intravenous administration of above contrast. Reconstructed coronal and sagittal images were also obtained. Automated exposure control and iterative construction methods were used. Findings: CTA chest: Normal pulmonary arteries without pulmonary embolism. No significant coronary artery calcification. No thoracic adenopathy. Unremarkable appearance of the chest wall soft tissues. Normal appearance of the airways. There are couple scattered tiny and subcentimeter calcified granulomas including a 9 mm partially calcified granuloma in the right upper lobe. There is mild centrilobular and paraseptal emphysema. No focal consolidation or evidence of active infectious or inflammatory process. No pleural effusion. No pneumothorax. Unremarkable appearance of the osseous structures. CT abdomen and pelvis: Unremarkable appearance of the liver, gallbladder, bile ducts, spleen, pancreas, adrenal glands, kidneys, ureters, bladder, prostate, and seminal vesicles. There are multiple mildly dilated air and fluid-filled loops of proximal and mid small bowel measuring up to 4.0 cm in diameter (series 900 image 43). There is no discrete transition point; there appears to be mild, gradual return to normal caliber small  bowel at the distal ileum. There is a short 3 cm segment of nondilated, narrowed/decompressed small bowel in the central lower abdomen (series 2 image 112, series 901 image 71); the appearance of this is nonspecific and there appears to be distended  fluid-filled loops upstream and downstream of this segment. No inflammatory change of the terminal ileum. There is some mesenteric hyperemia. There is a small amount of simple-appearing nonorganized fluid in the right lower quadrant in the lower paracolic gutter and among some small bowel loops (series 2 image 117). There is scattered gas within the colon. There is a small amount of fluid in the right colon. There is a small amount of stool in the sigmoid colon. Normal appendix. Nondilated stomach containing a small amount of ingested material. No pneumoperitoneum. No evidence of bowel ischemia. Normal appearance of the vasculature. No lymphadenopathy. There is a small fat-containing umbilical hernia.. No acute or suspicious bony findings.     Impression: Impression: No evidence of pulmonary embolism. No acute intrathoracic findings. Multiple distended and mildly dilated gas and fluid-filled loops of mid jejunum without discrete transition point. There is a small amount of free fluid in the right lower quadrant which is presumably reactive to this process. Gas and a small amount of fluid/stool remains within the colon. The findings are compatible with early ileus versus partial small bowel obstruction. No evidence of bowel ischemia or perforation. Details above. There is evidence of emphysema. Correlate with patient history and risk factors and please assess if the patient meets criteria for routine low dose CT lung cancer screening. Multiple scattered small pulmonary granulomata. Electronically Signed: Vaibhav Tapia MD  8/15/2024 11:59 AM EDT  Workstation ID: [id number]    CT Angiogram Chest Pulmonary Embolism    Result Date: 8/15/2024  CT ANGIOGRAM CHEST PULMONARY EMBOLISM, CT ABDOMEN PELVIS W CONTRAST Date of Exam: 8/15/2024 11:04 AM EDT Indication: epigastric abd pain. Comparison: None available. Technique: CTA of the chest was performed after the uneventful intravenous administration of 90 mL Isovue-300.  Reconstructed coronal and sagittal images were also obtained. In addition, a 3-D volume rendered image was created for interpretation. Automated exposure control and iterative reconstruction methods were used. Axial CT images were obtained of the abdomen and pelvis following the uneventful intravenous administration of above contrast. Reconstructed coronal and sagittal images were also obtained. Automated exposure control and iterative construction methods were used. Findings: CTA chest: Normal pulmonary arteries without pulmonary embolism. No significant coronary artery calcification. No thoracic adenopathy. Unremarkable appearance of the chest wall soft tissues. Normal appearance of the airways. There are couple scattered tiny and subcentimeter calcified granulomas including a 9 mm partially calcified granuloma in the right upper lobe. There is mild centrilobular and paraseptal emphysema. No focal consolidation or evidence of active infectious or inflammatory process. No pleural effusion. No pneumothorax. Unremarkable appearance of the osseous structures. CT abdomen and pelvis: Unremarkable appearance of the liver, gallbladder, bile ducts, spleen, pancreas, adrenal glands, kidneys, ureters, bladder, prostate, and seminal vesicles. There are multiple mildly dilated air and fluid-filled loops of proximal and mid small bowel measuring up to 4.0 cm in diameter (series 900 image 43). There is no discrete transition point; there appears to be mild, gradual return to normal caliber small  bowel at the distal ileum. There is a short 3 cm segment of nondilated, narrowed/decompressed small bowel in the central lower abdomen (series 2 image 112, series 901 image 71); the appearance of this is nonspecific and there appears to be distended fluid-filled loops upstream and downstream of this segment. No inflammatory change of the terminal ileum. There is some mesenteric hyperemia. There is a small amount of simple-appearing  nonorganized fluid in the right lower quadrant in the lower paracolic gutter and among some small bowel loops (series 2 image 117). There is scattered gas within the colon. There is a small amount of fluid in the right colon. There is a small amount of stool in the sigmoid colon. Normal appendix. Nondilated stomach containing a small amount of ingested material. No pneumoperitoneum. No evidence of bowel ischemia. Normal appearance of the vasculature. No lymphadenopathy. There is a small fat-containing umbilical hernia.. No acute or suspicious bony findings.     Impression: Impression: No evidence of pulmonary embolism. No acute intrathoracic findings. Multiple distended and mildly dilated gas and fluid-filled loops of mid jejunum without discrete transition point. There is a small amount of free fluid in the right lower quadrant which is presumably reactive to this process. Gas and a small amount of fluid/stool remains within the colon. The findings are compatible with early ileus versus partial small bowel obstruction. No evidence of bowel ischemia or perforation. Details above. There is evidence of emphysema. Correlate with patient history and risk factors and please assess if the patient meets criteria for routine low dose CT lung cancer screening. Multiple scattered small pulmonary granulomata. Electronically Signed: Vaibhav Tapia MD  8/15/2024 11:59 AM EDT  Workstation ID: [id number]    XR Chest 1 View    Result Date: 8/15/2024  XR CHEST 1 VW Date of Exam: 8/15/2024 10:01 AM EDT Indication: nv Comparison: 10/12/2010. Findings: Heart and pulmonary vessels appear within normal limits. Lung fields are clear of acute infiltrates or effusions. There is no pneumothorax.     Impression: Impression: No acute process. Electronically Signed: Lizbeth Walden MD  8/15/2024 10:15 AM EDT  Workstation ID: [id number]        [facility]      DDX: includes but is not limited to: pancreatitis, PUD, ACS    Pertinent features from physical  exam: VSS, abd soft and nontender.    Initial diagnostic plan: labs, ct abd/pelvis    Results from initial plan were reviewed and interpreted by me revealing bilirubin mildly elevated. Otherwise labs nonactionable. Ct imaging shows ileus which could be from his mounjara but he has been taking this medication for the last month vs early partial SBO. He is not actively vomiting     Diagnostic information from other sources: none    Interventions / Re-evaluation: morphine, fluids, zofran    Medications   sodium chloride 0.9 % flush 10 mL (has no administration in time range)   sodium chloride 0.9 % bolus 500 mL (0 mL Intravenous Stopped 8/15/24 1053)   ondansetron (ZOFRAN) injection 4 mg (4 mg Intravenous Given 8/15/24 1007)   morphine injection 4 mg (4 mg Intravenous Given 8/15/24 1009)   iopamidol (ISOVUE-300) 61 % injection 100 mL (90 mL Intravenous Given 8/15/24 1104)       Results/clinical rationale were discussed with patient     Consultations/Discussion of results with other physicians: Dr. Riddhi LIPSCOMB recommends transfer. Dr uCriel hospitalist accepts     Data interpreted: Nursing notes reviewed, vital signs reviewed.  Labs independently interpreted by me .  Imaging independently interpreted by me.  EKG independently interpreted by me.      Counseling: Discussed the results above with the patient regarding need for admission or discharge.  Patient understands and agrees plan of care.      -----  ED Disposition       ED Disposition   Decision to Admit    Condition   --    Comment   Level of Care: Telemetry [5]   Accepting Provider:: BEHZAD CURIEL [[id number]]               Final diagnoses:   Abdominal pain, unspecified abdominal location     Your Follow-Up Providers    Follow-up information has not been specified.       Contact information for after-discharge care    Follow-up information has not been specified.          Your medication list        CONTINUE taking these medications        Instructions Last Dose  Given Next Dose Due   atorvastatin 20 MG tablet  Commonly known as: LIPITOR      Take 20 mg by mouth Daily.       coenzyme Q10 100 MG capsule      Take 100 mg by mouth Daily.       furosemide 20 MG tablet  Commonly known as: LASIX      Take 20 mg by mouth 2 (Two) Times a Day As Needed.       metoprolol tartrate 25 MG tablet  Commonly known as: LOPRESSOR      Take 25 mg by mouth Every 12 (Twelve) Hours.       tiotropium bromide-olodaterol 2.5-2.5 MCG/ACT aerosol solution inhaler  Commonly known as: STIOLTO RESPIMAT      Inhale 2 puffs Daily.       traMADol 50 MG tablet  Commonly known as: ULTRAM      Take 50 mg by mouth Every 8 (Eight) Hours As Needed.       Ventolin  (90 Base) MCG/ACT inhaler  Generic drug: albuterol sulfate HFA      Inhale 2 puffs Every 4 (Four) Hours As Needed for Wheezing or Shortness of Air.

## 2024-08-16 ENCOUNTER — APPOINTMENT (OUTPATIENT)
Dept: GENERAL RADIOLOGY | Facility: HOSPITAL | Age: 54
DRG: 390 | End: 2024-08-16
Payer: MEDICAID

## 2024-08-16 LAB
ALBUMIN SERPL-MCNC: 3.8 G/DL (ref 3.5–5.2)
ALBUMIN/GLOB SERPL: 1.8 G/DL
ALP SERPL-CCNC: 58 U/L (ref 39–117)
ALT SERPL W P-5'-P-CCNC: 24 U/L (ref 1–41)
ANION GAP SERPL CALCULATED.3IONS-SCNC: 7 MMOL/L (ref 5–15)
AST SERPL-CCNC: 22 U/L (ref 1–40)
BASOPHILS # BLD AUTO: 0.05 10*3/MM3 (ref 0–0.2)
BASOPHILS NFR BLD AUTO: 0.7 % (ref 0–1.5)
BILIRUB SERPL-MCNC: 1.1 MG/DL (ref 0–1.2)
BUN SERPL-MCNC: 12 MG/DL (ref 6–20)
BUN/CREAT SERPL: 10.4 (ref 7–25)
CALCIUM SPEC-SCNC: 8.5 MG/DL (ref 8.6–10.5)
CHLORIDE SERPL-SCNC: 105 MMOL/L (ref 98–107)
CO2 SERPL-SCNC: 27 MMOL/L (ref 22–29)
CREAT SERPL-MCNC: 1.15 MG/DL (ref 0.76–1.27)
DEPRECATED RDW RBC AUTO: 41.7 FL (ref 37–54)
EGFRCR SERPLBLD CKD-EPI 2021: 74.7 ML/MIN/1.73
EOSINOPHIL # BLD AUTO: 0.34 10*3/MM3 (ref 0–0.4)
EOSINOPHIL NFR BLD AUTO: 4.7 % (ref 0.3–6.2)
ERYTHROCYTE [DISTWIDTH] IN BLOOD BY AUTOMATED COUNT: 12.5 % (ref 12.3–15.4)
GLOBULIN UR ELPH-MCNC: 2.1 GM/DL
GLUCOSE SERPL-MCNC: 88 MG/DL (ref 65–99)
HCT VFR BLD AUTO: 42.2 % (ref 37.5–51)
HGB BLD-MCNC: 14 G/DL (ref 13–17.7)
IMM GRANULOCYTES # BLD AUTO: 0.02 10*3/MM3 (ref 0–0.05)
IMM GRANULOCYTES NFR BLD AUTO: 0.3 % (ref 0–0.5)
LYMPHOCYTES # BLD AUTO: 2.33 10*3/MM3 (ref 0.7–3.1)
LYMPHOCYTES NFR BLD AUTO: 32.2 % (ref 19.6–45.3)
MCH RBC QN AUTO: 30.4 PG (ref 26.6–33)
MCHC RBC AUTO-ENTMCNC: 33.2 G/DL (ref 31.5–35.7)
MCV RBC AUTO: 91.7 FL (ref 79–97)
MONOCYTES # BLD AUTO: 0.6 10*3/MM3 (ref 0.1–0.9)
MONOCYTES NFR BLD AUTO: 8.3 % (ref 5–12)
NEUTROPHILS NFR BLD AUTO: 3.89 10*3/MM3 (ref 1.7–7)
NEUTROPHILS NFR BLD AUTO: 53.8 % (ref 42.7–76)
NRBC BLD AUTO-RTO: 0 /100 WBC (ref 0–0.2)
PLATELET # BLD AUTO: 184 10*3/MM3 (ref 140–450)
PMV BLD AUTO: 11.3 FL (ref 6–12)
POTASSIUM SERPL-SCNC: 4.7 MMOL/L (ref 3.5–5.2)
PROT SERPL-MCNC: 5.9 G/DL (ref 6–8.5)
RBC # BLD AUTO: 4.6 10*6/MM3 (ref 4.14–5.8)
SODIUM SERPL-SCNC: 139 MMOL/L (ref 136–145)
WBC NRBC COR # BLD AUTO: 7.23 10*3/MM3 (ref 3.4–10.8)

## 2024-08-16 PROCEDURE — 80053 COMPREHEN METABOLIC PANEL: CPT | Performed by: INTERNAL MEDICINE

## 2024-08-16 PROCEDURE — 25010000002 MORPHINE PER 10 MG: Performed by: INTERNAL MEDICINE

## 2024-08-16 PROCEDURE — 74250 X-RAY XM SM INT 1CNTRST STD: CPT

## 2024-08-16 PROCEDURE — 85025 COMPLETE CBC W/AUTO DIFF WBC: CPT | Performed by: INTERNAL MEDICINE

## 2024-08-16 PROCEDURE — 25010000002 ONDANSETRON PER 1 MG: Performed by: STUDENT IN AN ORGANIZED HEALTH CARE EDUCATION/TRAINING PROGRAM

## 2024-08-16 PROCEDURE — 0 DIATRIZOATE MEGLUMINE & SODIUM PER 1 ML: Performed by: INTERNAL MEDICINE

## 2024-08-16 PROCEDURE — 25010000002 KETOROLAC TROMETHAMINE PER 15 MG: Performed by: INTERNAL MEDICINE

## 2024-08-16 PROCEDURE — 99232 SBSQ HOSP IP/OBS MODERATE 35: CPT | Performed by: INTERNAL MEDICINE

## 2024-08-16 PROCEDURE — 25010000002 HEPARIN (PORCINE) PER 1000 UNITS: Performed by: INTERNAL MEDICINE

## 2024-08-16 PROCEDURE — 74018 RADEX ABDOMEN 1 VIEW: CPT

## 2024-08-16 RX ORDER — SIMVASTATIN 5 MG
5 TABLET ORAL NIGHTLY
COMMUNITY

## 2024-08-16 RX ORDER — KETOROLAC TROMETHAMINE 15 MG/ML
15 INJECTION, SOLUTION INTRAMUSCULAR; INTRAVENOUS EVERY 6 HOURS PRN
Status: DISCONTINUED | OUTPATIENT
Start: 2024-08-16 | End: 2024-08-18 | Stop reason: HOSPADM

## 2024-08-16 RX ORDER — LANOLIN ALCOHOL/MO/W.PET/CERES
1000 CREAM (GRAM) TOPICAL DAILY
COMMUNITY

## 2024-08-16 RX ORDER — TRAZODONE HYDROCHLORIDE 50 MG/1
50 TABLET ORAL NIGHTLY
COMMUNITY

## 2024-08-16 RX ORDER — HYDROCODONE BITARTRATE AND ACETAMINOPHEN 5; 325 MG/1; MG/1
1 TABLET ORAL EVERY 6 HOURS PRN
COMMUNITY

## 2024-08-16 RX ORDER — TRAZODONE HYDROCHLORIDE 50 MG/1
50 TABLET ORAL NIGHTLY
Status: DISCONTINUED | OUTPATIENT
Start: 2024-08-16 | End: 2024-08-18 | Stop reason: HOSPADM

## 2024-08-16 RX ADMIN — DIATRIZOATE MEGLUMINE AND DIATRIZOATE SODIUM 240 ML: 660; 100 LIQUID ORAL; RECTAL at 10:28

## 2024-08-16 RX ADMIN — ONDANSETRON 4 MG: 2 INJECTION INTRAMUSCULAR; INTRAVENOUS at 20:06

## 2024-08-16 RX ADMIN — TRAZODONE HYDROCHLORIDE 50 MG: 50 TABLET ORAL at 22:27

## 2024-08-16 RX ADMIN — METOPROLOL TARTRATE 25 MG: 25 TABLET, FILM COATED ORAL at 11:03

## 2024-08-16 RX ADMIN — METOPROLOL TARTRATE 25 MG: 25 TABLET, FILM COATED ORAL at 11:05

## 2024-08-16 RX ADMIN — HEPARIN SODIUM 5000 UNITS: 5000 INJECTION INTRAVENOUS; SUBCUTANEOUS at 05:45

## 2024-08-16 RX ADMIN — MORPHINE SULFATE 2 MG: 2 INJECTION, SOLUTION INTRAMUSCULAR; INTRAVENOUS at 20:06

## 2024-08-16 RX ADMIN — METOPROLOL TARTRATE 25 MG: 25 TABLET, FILM COATED ORAL at 20:06

## 2024-08-16 RX ADMIN — HEPARIN SODIUM 5000 UNITS: 5000 INJECTION INTRAVENOUS; SUBCUTANEOUS at 15:22

## 2024-08-16 RX ADMIN — HEPARIN SODIUM 5000 UNITS: 5000 INJECTION INTRAVENOUS; SUBCUTANEOUS at 20:05

## 2024-08-16 RX ADMIN — POTASSIUM CHLORIDE, DEXTROSE MONOHYDRATE AND SODIUM CHLORIDE 100 ML/HR: 150; 5; 450 INJECTION, SOLUTION INTRAVENOUS at 15:23

## 2024-08-16 RX ADMIN — KETOROLAC TROMETHAMINE 15 MG: 15 INJECTION, SOLUTION INTRAMUSCULAR; INTRAVENOUS at 00:15

## 2024-08-16 RX ADMIN — POTASSIUM CHLORIDE, DEXTROSE MONOHYDRATE AND SODIUM CHLORIDE 100 ML/HR: 150; 5; 450 INJECTION, SOLUTION INTRAVENOUS at 06:16

## 2024-08-16 NOTE — PROGRESS NOTES
[facility] Medicine Services  PROGRESS NOTE    Patient Name: Francisco Javier Ortega  : 1970  MRN: [id number]    Date of Admission: 8/15/2024  Primary Care Physician: Ronal Joe APRN    Subjective   Subjective     CC:Follow-up abdominal pain      HPI: No acute events overnight, patient states that he feels slightly better, he is passing gas but no BM yet.      Objective   Objective     Vital Signs:   Temp:  [97.5 °F (36.4 °C)-98.5 °F (36.9 °C)] 97.8 °F (36.6 °C)  Heart Rate:  [69-89] 69  Resp:  [16-18] 18  BP: ()/(71-93) 97/71     Physical Exam:  Constitutional: No acute distress, awake, alert  HENT: NCAT, mucous membranes moist  Respiratory: Clear to auscultation bilaterally, respiratory effort normal   Cardiovascular: RRR, no murmurs, rubs, or gallops  Gastrointestinal: Positive bowel sounds, soft, moderately tender  Musculoskeletal: No bilateral ankle edema  Psychiatric: Appropriate affect, cooperative  Neurologic: Oriented x 3, nonfocal  Skin: No rashes    Results Reviewed:  LAB RESULTS:      Lab 24  0346 08/15/24  0948   WBC 7.23 10.26   HEMOGLOBIN 14.0 16.2   HEMATOCRIT 42.2 47.3   PLATELETS 184 218   NEUTROS ABS 3.89 7.87*   IMMATURE GRANS (ABS) 0.02 0.02   LYMPHS ABS 2.33 1.46   MONOS ABS 0.60 0.71   EOS ABS 0.34 0.16   MCV 91.7 91.1         Lab 24  0346 08/15/24  0948   SODIUM 139 137   POTASSIUM 4.7 3.9   CHLORIDE 105 101   CO2 27.0 25.6   ANION GAP 7.0 10.4   BUN 12 13   CREATININE 1.15 1.00   EGFR 74.7 88.3   GLUCOSE 88 110*   CALCIUM 8.5* 9.2         Lab 24  0346 08/15/24  0948   TOTAL PROTEIN 5.9* 7.9   ALBUMIN 3.8 4.4   GLOBULIN 2.1 3.5   ALT (SGPT) 24 20   AST (SGOT) 22 31   BILIRUBIN 1.1 1.8*   ALK PHOS 58 69   LIPASE  --  22         Lab 08/15/24  0948   HSTROP T 14                 Brief Urine Lab Results  (Last result in the past 365 days)        Color   Clarity   Blood   Leuk Est   Nitrite   Protein   CREAT   Urine HCG        08/15/24 1007  Dark Yellow   Clear   Negative   Negative   Negative   Trace                   Microbiology Results Abnormal       None            XR Abdomen KUB    Result Date: 8/16/2024  XR ABDOMEN KUB Date of Exam: 8/16/2024 3:24 AM EDT Indication: eval ileus Follow up for SBO Comparison: None available. Findings: There are multiple dilated loops of small bowel in the central abdomen consistent with small bowel obstruction. There is no gross free air. There is contrast in the urinary bladder.     Impression: Impression: Small bowel obstruction. Electronically Signed: Dameon Chicas MD  8/16/2024 4:55 AM EDT  Workstation ID: [id number]    CT Abdomen Pelvis With Contrast    Result Date: 8/15/2024  CT ANGIOGRAM CHEST PULMONARY EMBOLISM, CT ABDOMEN PELVIS W CONTRAST Date of Exam: 8/15/2024 11:04 AM EDT Indication: epigastric abd pain. Comparison: None available. Technique: CTA of the chest was performed after the uneventful intravenous administration of 90 mL Isovue-300. Reconstructed coronal and sagittal images were also obtained. In addition, a 3-D volume rendered image was created for interpretation. Automated exposure control and iterative reconstruction methods were used. Axial CT images were obtained of the abdomen and pelvis following the uneventful intravenous administration of above contrast. Reconstructed coronal and sagittal images were also obtained. Automated exposure control and iterative construction methods were used. Findings: CTA chest: Normal pulmonary arteries without pulmonary embolism. No significant coronary artery calcification. No thoracic adenopathy. Unremarkable appearance of the chest wall soft tissues. Normal appearance of the airways. There are couple scattered tiny and subcentimeter calcified granulomas including a 9 mm partially calcified granuloma in the right upper lobe. There is mild centrilobular and paraseptal emphysema. No focal consolidation or evidence of active infectious or inflammatory process.  No pleural effusion. No pneumothorax. Unremarkable appearance of the osseous structures. CT abdomen and pelvis: Unremarkable appearance of the liver, gallbladder, bile ducts, spleen, pancreas, adrenal glands, kidneys, ureters, bladder, prostate, and seminal vesicles. There are multiple mildly dilated air and fluid-filled loops of proximal and mid small bowel measuring up to 4.0 cm in diameter (series 900 image 43). There is no discrete transition point; there appears to be mild, gradual return to normal caliber small  bowel at the distal ileum. There is a short 3 cm segment of nondilated, narrowed/decompressed small bowel in the central lower abdomen (series 2 image 112, series 901 image 71); the appearance of this is nonspecific and there appears to be distended fluid-filled loops upstream and downstream of this segment. No inflammatory change of the terminal ileum. There is some mesenteric hyperemia. There is a small amount of simple-appearing nonorganized fluid in the right lower quadrant in the lower paracolic gutter and among some small bowel loops (series 2 image 117). There is scattered gas within the colon. There is a small amount of fluid in the right colon. There is a small amount of stool in the sigmoid colon. Normal appendix. Nondilated stomach containing a small amount of ingested material. No pneumoperitoneum. No evidence of bowel ischemia. Normal appearance of the vasculature. No lymphadenopathy. There is a small fat-containing umbilical hernia.. No acute or suspicious bony findings.     Impression: Impression: No evidence of pulmonary embolism. No acute intrathoracic findings. Multiple distended and mildly dilated gas and fluid-filled loops of mid jejunum without discrete transition point. There is a small amount of free fluid in the right lower quadrant which is presumably reactive to this process. Gas and a small amount of fluid/stool remains within the colon. The findings are compatible with early  ileus versus partial small bowel obstruction. No evidence of bowel ischemia or perforation. Details above. There is evidence of emphysema. Correlate with patient history and risk factors and please assess if the patient meets criteria for routine low dose CT lung cancer screening. Multiple scattered small pulmonary granulomata. Electronically Signed: Vaibhav Tapia MD  8/15/2024 11:59 AM EDT  Workstation ID: [id number]    CT Angiogram Chest Pulmonary Embolism    Result Date: 8/15/2024  CT ANGIOGRAM CHEST PULMONARY EMBOLISM, CT ABDOMEN PELVIS W CONTRAST Date of Exam: 8/15/2024 11:04 AM EDT Indication: epigastric abd pain. Comparison: None available. Technique: CTA of the chest was performed after the uneventful intravenous administration of 90 mL Isovue-300. Reconstructed coronal and sagittal images were also obtained. In addition, a 3-D volume rendered image was created for interpretation. Automated exposure control and iterative reconstruction methods were used. Axial CT images were obtained of the abdomen and pelvis following the uneventful intravenous administration of above contrast. Reconstructed coronal and sagittal images were also obtained. Automated exposure control and iterative construction methods were used. Findings: CTA chest: Normal pulmonary arteries without pulmonary embolism. No significant coronary artery calcification. No thoracic adenopathy. Unremarkable appearance of the chest wall soft tissues. Normal appearance of the airways. There are couple scattered tiny and subcentimeter calcified granulomas including a 9 mm partially calcified granuloma in the right upper lobe. There is mild centrilobular and paraseptal emphysema. No focal consolidation or evidence of active infectious or inflammatory process. No pleural effusion. No pneumothorax. Unremarkable appearance of the osseous structures. CT abdomen and pelvis: Unremarkable appearance of the liver, gallbladder, bile ducts, spleen, pancreas,  adrenal glands, kidneys, ureters, bladder, prostate, and seminal vesicles. There are multiple mildly dilated air and fluid-filled loops of proximal and mid small bowel measuring up to 4.0 cm in diameter (series 900 image 43). There is no discrete transition point; there appears to be mild, gradual return to normal caliber small  bowel at the distal ileum. There is a short 3 cm segment of nondilated, narrowed/decompressed small bowel in the central lower abdomen (series 2 image 112, series 901 image 71); the appearance of this is nonspecific and there appears to be distended fluid-filled loops upstream and downstream of this segment. No inflammatory change of the terminal ileum. There is some mesenteric hyperemia. There is a small amount of simple-appearing nonorganized fluid in the right lower quadrant in the lower paracolic gutter and among some small bowel loops (series 2 image 117). There is scattered gas within the colon. There is a small amount of fluid in the right colon. There is a small amount of stool in the sigmoid colon. Normal appendix. Nondilated stomach containing a small amount of ingested material. No pneumoperitoneum. No evidence of bowel ischemia. Normal appearance of the vasculature. No lymphadenopathy. There is a small fat-containing umbilical hernia.. No acute or suspicious bony findings.     Impression: Impression: No evidence of pulmonary embolism. No acute intrathoracic findings. Multiple distended and mildly dilated gas and fluid-filled loops of mid jejunum without discrete transition point. There is a small amount of free fluid in the right lower quadrant which is presumably reactive to this process. Gas and a small amount of fluid/stool remains within the colon. The findings are compatible with early ileus versus partial small bowel obstruction. No evidence of bowel ischemia or perforation. Details above. There is evidence of emphysema. Correlate with patient history and risk factors and  please assess if the patient meets criteria for routine low dose CT lung cancer screening. Multiple scattered small pulmonary granulomata. Electronically Signed: Vaibhav Tapia MD  8/15/2024 11:59 AM EDT  Workstation ID: [id number]    XR Chest 1 View    Result Date: 8/15/2024  XR CHEST 1 VW Date of Exam: 8/15/2024 10:01 AM EDT Indication: nv Comparison: 10/12/2010. Findings: Heart and pulmonary vessels appear within normal limits. Lung fields are clear of acute infiltrates or effusions. There is no pneumothorax.     Impression: Impression: No acute process. Electronically Signed: Lizbeth Walden MD  8/15/2024 10:15 AM EDT  Workstation ID: [id number]         Current medications:  Scheduled Meds:heparin (porcine), 5,000 Units, Subcutaneous, Q8H  metoprolol tartrate, 25 mg, Oral, Q12H      Continuous Infusions:dextrose 5 % and sodium chloride 0.45 % with KCl 20 mEq/L, 100 mL/hr, Last Rate: 100 mL/hr (08/16/24 0616)      PRN Meds:.  ipratropium-albuterol    ketorolac    Morphine    ondansetron    sodium chloride    Assessment & Plan   Assessment & Plan     Active Hospital Problems    Diagnosis  POA    **Ileus [K56.7]  Yes    COPD (chronic obstructive pulmonary disease) [J44.9]  Yes    PEDRO (obstructive sleep apnea) [G47.33]  Yes    Class 2 obesity due to excess calories with body mass index (BMI) of 36.0 to 36.9 in adult [E66.09, Z68.36]  Not Applicable      Resolved Hospital Problems   No resolved problems to display.        Brief Hospital Course to date:  Francisco Javier Ortega is a 54 y.o. male with past medical history of COPD, PEDRO, syncope and tachycardia, currently on Mounjaro who presented to the ED with 3 days of epigastric abdominal pain with associated nausea and vomiting admitted with early ileus versus partial SBO    Early ileus vs partial SBO  -CT abdomen pelvis shows multiple distended and mildly dilated gas and fluid-filled loops compatible with early ileus versus partial SBO  -Follow-up GI PCR, continue supportive  therapy, currently on IV fluids, antiemetics  -General Surgery consulted, plan for SBFT this morning  -Recommend continue holding Mounjaro, last dose 8/12/2024  -Continue PPI     COPD  Not in exacerbation, continue duonebs PRN     PEDRO  -CPAP if needed     Obesity BMI 36  Umbilical hernia     Pulmonary nodules   -Previously followed by Dr Denilson Randle  -measuring 9mm now,  pulm nodule clinic     ALSO never had a colonoscopy which he has plans for in the near future    All problems listed above are new to me    Expected Discharge Location and Transportation: Home  Expected Discharge   Expected Discharge Date: 8/19/2024; Expected Discharge Time:      VTE Prophylaxis:  Pharmacologic VTE prophylaxis orders are present.         AM-PAC 6 Clicks Score (PT): 24 (08/15/24 2000)    CODE STATUS:   Code Status and Medical Interventions: CPR (Attempt to Resuscitate); Full Support   Ordered at: 08/15/24 2138     Code Status (Patient has no pulse and is not breathing):    CPR (Attempt to Resuscitate)     Medical Interventions (Patient has pulse or is breathing):    Full Support       Allen Noel MD  08/16/24

## 2024-08-16 NOTE — PLAN OF CARE
Problem: Adult Inpatient Plan of Care  Goal: Plan of Care Review  Outcome: Ongoing, Progressing  Goal: Patient-Specific Goal (Individualized)  Outcome: Ongoing, Progressing  Goal: Absence of Hospital-Acquired Illness or Injury  Outcome: Ongoing, Progressing  Intervention: Identify and Manage Fall Risk  Description: Perform standard risk assessment on admission using a validated tool or comprehensive approach appropriate to the patient; reassess fall risk frequently, with change in status or transfer to another level of care.  Communicate fall injury risk to interprofessional healthcare team.  Determine need for increased observation, equipment and environmental modification, such as low bed, signage and supportive, nonskid footwear.  Adjust safety measures to individual developmental age, stage and identified risk factors.  Reinforce the importance of safety and physical activity with patient and family.  Perform regular intentional rounding to assess need for position change, pain assessment and personal needs, including assistance with toileting.  Recent Flowsheet Documentation  Taken 8/16/2024 0400 by Cindi Ramirez, RN  Safety Promotion/Fall Prevention:   activity supervised   assistive device/personal items within reach   clutter free environment maintained   fall prevention program maintained   lighting adjusted   mobility aid in reach   nonskid shoes/slippers when out of bed   room organization consistent   safety round/check completed  Taken 8/16/2024 0207 by Cindi Ramirez, RN  Safety Promotion/Fall Prevention:   activity supervised   assistive device/personal items within reach   clutter free environment maintained   fall prevention program maintained   lighting adjusted   mobility aid in reach   nonskid shoes/slippers when out of bed   room organization consistent   safety round/check completed  Taken 8/16/2024 0000 by Cindi Ramirez, RN  Safety Promotion/Fall Prevention:   activity supervised    assistive device/personal items within reach   clutter free environment maintained   fall prevention program maintained   lighting adjusted   mobility aid in reach   nonskid shoes/slippers when out of bed   room organization consistent   safety round/check completed  Taken 8/15/2024 2200 by Cindi Ramirez RN  Safety Promotion/Fall Prevention:   activity supervised   assistive device/personal items within reach   clutter free environment maintained   fall prevention program maintained   lighting adjusted   mobility aid in reach   nonskid shoes/slippers when out of bed   room organization consistent   safety round/check completed  Taken 8/15/2024 2000 by Cindi Ramirez RN  Safety Promotion/Fall Prevention:   activity supervised   clutter free environment maintained   assistive device/personal items within reach   fall prevention program maintained   lighting adjusted   mobility aid in reach   nonskid shoes/slippers when out of bed   room organization consistent   safety round/check completed  Intervention: Prevent Skin Injury  Description: Perform a screening for skin injury risk, such as pressure or moisture associated skin damage on admission and at regular intervals throughout hospital stay.  Keep all areas of skin (especially folds) clean and dry.  Maintain adequate skin hydration.  Relieve and redistribute pressure and protect bony prominences; implement measures based on patient-specific risk factors.  Match turning and repositioning schedule to clinical condition.  Encourage weight shift frequently; assist with reposition if unable to complete independently.  Float heels off bed; avoid pressure on the Achilles tendon.  Keep skin free from extended contact with medical devices.  Encourage functional activity and mobility, as early as tolerated.  Use aids (e.g., slide boards, mechanical lift) during transfer.  Recent Flowsheet Documentation  Taken 8/16/2024 0400 by Cindi Ramirez, RN  Body Position: position  changed independently  Taken 8/16/2024 0207 by Cindi Ramirez RN  Body Position: position changed independently  Taken 8/15/2024 2000 by Cindi Ramirez RN  Body Position: position changed independently  Skin Protection:   adhesive use limited   tubing/devices free from skin contact   transparent dressing maintained   skin-to-skin areas padded   skin-to-device areas padded   skin sealant/moisture barrier applied  Intervention: Prevent and Manage VTE (Venous Thromboembolism) Risk  Description: Assess for VTE (venous thromboembolism) risk.  Encourage and assist with early ambulation.  Initiate and maintain compression or other therapy, as indicated, based on identified risk in accordance with organizational protocol and provider order.  Encourage both active and passive leg exercises while in bed, if unable to ambulate.  Recent Flowsheet Documentation  Taken 8/16/2024 0400 by Cindi Ramirez RN  Activity Management: up ad rosa  Taken 8/16/2024 0207 by Cindi Ramirez RN  Activity Management: up ad rosa  Taken 8/16/2024 0000 by Cindi Ramirez RN  Activity Management: up ad rosa  Taken 8/15/2024 2200 by Cindi Ramirez RN  Activity Management: up ad rosa  Taken 8/15/2024 2000 by Cindi Ramirez RN  Activity Management: up ad rosa  VTE Prevention/Management: (SEE MAR) other (see comments)  Intervention: Prevent Infection  Description: Maintain skin and mucous membrane integrity; promote hand, oral and pulmonary hygiene.  Optimize fluid balance, nutrition, sleep and glycemic control to maximize infection resistance.  Identify potential sources of infection early to prevent or mitigate progression of infection (e.g., wound, lines, devices).  Evaluate ongoing need for invasive devices; remove promptly when no longer indicated.  Recent Flowsheet Documentation  Taken 8/15/2024 2000 by Cindi Ramirez RN  Infection Prevention:   environmental surveillance performed   equipment surfaces disinfected   hand hygiene promoted    personal protective equipment utilized   rest/sleep promoted  Goal: Optimal Comfort and Wellbeing  Outcome: Ongoing, Progressing  Intervention: Monitor Pain and Promote Comfort  Description: Assess pain level, treatment efficacy and patient response at regular intervals using a consistent pain scale.  Consider the presence and impact of preexisting chronic pain.  Encourage patient and caregiver involvement in pain assessment, interventions and safety measures.  Recent Flowsheet Documentation  Taken 8/15/2024 2024 by Cindi Ramirez RN  Pain Management Interventions: see MAR  Intervention: Provide Person-Centered Care  Description: Use a family-focused approach to care.  Develop trust and rapport by proactively providing information, encouraging questions, addressing concerns and offering reassurance.  Acknowledge emotional response to hospitalization.  Recognize and utilize personal coping strategies.  Honor spiritual and cultural preferences.  Recent Flowsheet Documentation  Taken 8/15/2024 2000 by Cindi Ramirez RN  Trust Relationship/Rapport:   care explained   choices provided   emotional support provided   empathic listening provided   questions answered   questions encouraged   thoughts/feelings acknowledged  Goal: Readiness for Transition of Care  Outcome: Ongoing, Progressing     Problem: Pain Acute  Goal: Acceptable Pain Control and Functional Ability  Outcome: Ongoing, Progressing  Intervention: Prevent or Manage Pain  Description: Evaluate pain level, effect of treatment and patient response at regular intervals.  Minimize painful stimuli; coordinate care and adjust environment (e.g., light, noise, unnecessary movement); promote sleep/rest.  Match pharmacologic analgesia to severity and type of pain mechanism (e.g., neuropathic, muscle, inflammatory); consider multimodal approach (e.g., nonopioid, opioid, adjuvant).  Provide medication at regular intervals; titrate to patient response; premedicate for  painful procedures.  Manage breakthrough pain with additional doses; consider rotation or switching medication.  Monitor for signs of substance tolerance (increased dose to reach desired effect, decreased effect with same dose).  Manage medication-induced effects, such as constipation, nausea, pruritus, urinary retention, somnolence and dizziness.  Provide multimodal interventions, such as as physical activity, therapeutic exercise, yoga, TENS (transcutaneous electrical nerve stimulation) and manual therapy.  Train in functional activity modifications, such as body mechanics, posture, ergonomics, energy conservation and activity pacing.  Consider addition of complementary or alternative therapy, such as acupuncture, hypnosis or therapeutic touch.  Recent Flowsheet Documentation  Taken 8/16/2024 0400 by Cindi Ramirez RN  Medication Review/Management: medications reviewed  Taken 8/16/2024 0207 by Cindi Ramirez RN  Medication Review/Management: medications reviewed  Taken 8/16/2024 0000 by Cindi Ramirez RN  Medication Review/Management: medications reviewed  Taken 8/15/2024 2200 by Cindi Ramirez RN  Medication Review/Management: medications reviewed  Taken 8/15/2024 2000 by Cindi Ramirez RN  Medication Review/Management: medications reviewed  Intervention: Develop Pain Management Plan  Description: Acknowledge patient as the expert in pain self-management.  Use a consistent, validated tool for pain assessment; include function and quality of life.  Evaluate risk for opioid use and dependence.  Set pain management goals; determine acceptable level of discomfort to allow for maximal functioning.  Determine [id number] pain management plan, including both pharmacologic and nonpharmacologic measures; integrate management of chronic (persistent) pain.  Identify and integrate past successful treatment measures, if able.  Encourage patient and caregiver involvement in pain assessment, interventions and  safety measures.  Re-evaluate plan regularly.  Recent Flowsheet Documentation  Taken 8/15/2024 2024 by Cindi Ramirez, RN  Pain Management Interventions: see MAR  Intervention: Optimize Psychosocial Wellbeing  Description: Facilitate patient’s self-control over pain by providing pain information and allowing choices in treatment.  Consider and address emotional response to pain.  Explore and promote use of coping strategies; address barriers to successful coping.  Evaluate and assist with psychosocial, cultural and spiritual factors impacting pain.  Modify pain perception using techniques, such as distraction, mindfulness, guided imagery, meditation or music.  Assess for risk factors for developing chronic pain, such as depression, fear, pain avoidance and pain catastrophizing.  Consider referral for ongoing coping support, such as education, relaxation training and role of thoughts.  Recent Flowsheet Documentation  Taken 8/15/2024 2000 by Cindi Ramirez, RN  Diversional Activities: television     Problem: Infection  Goal: Absence of Infection Signs and Symptoms  Outcome: Ongoing, Progressing   Goal Outcome Evaluation:

## 2024-08-16 NOTE — PLAN OF CARE
Problem: Adult Inpatient Plan of Care  Goal: Plan of Care Review  8/16/2024 0626 by Cindi Ramirez RN  Outcome: Ongoing, Progressing  8/16/2024 0503 by Cindi Ramirez RN  Outcome: Ongoing, Progressing  Goal: Patient-Specific Goal (Individualized)  8/16/2024 0626 by Cindi Ramirez RN  Outcome: Ongoing, Progressing  8/16/2024 0503 by Cindi Ramirez RN  Outcome: Ongoing, Progressing  Goal: Absence of Hospital-Acquired Illness or Injury  8/16/2024 0626 by Cindi Ramirez RN  Outcome: Ongoing, Progressing  8/16/2024 0503 by Cindi Ramirez RN  Outcome: Ongoing, Progressing  Intervention: Identify and Manage Fall Risk  Description: Perform standard risk assessment on admission using a validated tool or comprehensive approach appropriate to the patient; reassess fall risk frequently, with change in status or transfer to another level of care.  Communicate fall injury risk to interprofessional healthcare team.  Determine need for increased observation, equipment and environmental modification, such as low bed, signage and supportive, nonskid footwear.  Adjust safety measures to individual developmental age, stage and identified risk factors.  Reinforce the importance of safety and physical activity with patient and family.  Perform regular intentional rounding to assess need for position change, pain assessment and personal needs, including assistance with toileting.  Recent Flowsheet Documentation  Taken 8/16/2024 0400 by Cindi Ramirez RN  Safety Promotion/Fall Prevention:   activity supervised   assistive device/personal items within reach   clutter free environment maintained   fall prevention program maintained   lighting adjusted   mobility aid in reach   nonskid shoes/slippers when out of bed   room organization consistent   safety round/check completed  Taken 8/16/2024 0207 by Cindi Ramirez RN  Safety Promotion/Fall Prevention:   activity supervised   assistive device/personal items within reach   clutter  free environment maintained   fall prevention program maintained   lighting adjusted   mobility aid in reach   nonskid shoes/slippers when out of bed   room organization consistent   safety round/check completed  Taken 8/16/2024 0000 by Cindi Ramirez RN  Safety Promotion/Fall Prevention:   activity supervised   assistive device/personal items within reach   clutter free environment maintained   fall prevention program maintained   lighting adjusted   mobility aid in reach   nonskid shoes/slippers when out of bed   room organization consistent   safety round/check completed  Taken 8/15/2024 2200 by Cindi Ramirez RN  Safety Promotion/Fall Prevention:   activity supervised   assistive device/personal items within reach   clutter free environment maintained   fall prevention program maintained   lighting adjusted   mobility aid in reach   nonskid shoes/slippers when out of bed   room organization consistent   safety round/check completed  Taken 8/15/2024 2000 by Cindi Ramirez RN  Safety Promotion/Fall Prevention:   activity supervised   clutter free environment maintained   assistive device/personal items within reach   fall prevention program maintained   lighting adjusted   mobility aid in reach   nonskid shoes/slippers when out of bed   room organization consistent   safety round/check completed  Intervention: Prevent Skin Injury  Description: Perform a screening for skin injury risk, such as pressure or moisture associated skin damage on admission and at regular intervals throughout hospital stay.  Keep all areas of skin (especially folds) clean and dry.  Maintain adequate skin hydration.  Relieve and redistribute pressure and protect bony prominences; implement measures based on patient-specific risk factors.  Match turning and repositioning schedule to clinical condition.  Encourage weight shift frequently; assist with reposition if unable to complete independently.  Float heels off bed; avoid pressure on  the Achilles tendon.  Keep skin free from extended contact with medical devices.  Encourage functional activity and mobility, as early as tolerated.  Use aids (e.g., slide boards, mechanical lift) during transfer.  Recent Flowsheet Documentation  Taken 8/16/2024 0400 by Cindi Ramirez RN  Body Position: position changed independently  Taken 8/16/2024 0207 by Cindi Ramirez RN  Body Position: position changed independently  Taken 8/15/2024 2000 by Cindi Ramirez RN  Body Position: position changed independently  Skin Protection:   adhesive use limited   tubing/devices free from skin contact   transparent dressing maintained   skin-to-skin areas padded   skin-to-device areas padded   skin sealant/moisture barrier applied  Intervention: Prevent and Manage VTE (Venous Thromboembolism) Risk  Description: Assess for VTE (venous thromboembolism) risk.  Encourage and assist with early ambulation.  Initiate and maintain compression or other therapy, as indicated, based on identified risk in accordance with organizational protocol and provider order.  Encourage both active and passive leg exercises while in bed, if unable to ambulate.  Recent Flowsheet Documentation  Taken 8/16/2024 0400 by Cindi Ramirez RN  Activity Management: up ad rosa  Taken 8/16/2024 0207 by Cindi Ramirez RN  Activity Management: up ad rosa  Taken 8/16/2024 0000 by Cindi Ramirez RN  Activity Management: up ad rosa  Taken 8/15/2024 2200 by Cindi Ramirez RN  Activity Management: up ad rosa  Taken 8/15/2024 2000 by Cindi Ramirez RN  Activity Management: up ad rosa  VTE Prevention/Management: (SEE MAR) other (see comments)  Intervention: Prevent Infection  Description: Maintain skin and mucous membrane integrity; promote hand, oral and pulmonary hygiene.  Optimize fluid balance, nutrition, sleep and glycemic control to maximize infection resistance.  Identify potential sources of infection early to prevent or mitigate progression of infection (e.g.,  wound, lines, devices).  Evaluate ongoing need for invasive devices; remove promptly when no longer indicated.  Recent Flowsheet Documentation  Taken 8/15/2024 2000 by Cindi Ramirez RN  Infection Prevention:   environmental surveillance performed   equipment surfaces disinfected   hand hygiene promoted   personal protective equipment utilized   rest/sleep promoted  Goal: Optimal Comfort and Wellbeing  8/16/2024 0626 by Cindi Ramirez RN  Outcome: Ongoing, Progressing  8/16/2024 0503 by Cindi Ramirez RN  Outcome: Ongoing, Progressing  Intervention: Monitor Pain and Promote Comfort  Description: Assess pain level, treatment efficacy and patient response at regular intervals using a consistent pain scale.  Consider the presence and impact of preexisting chronic pain.  Encourage patient and caregiver involvement in pain assessment, interventions and safety measures.  Recent Flowsheet Documentation  Taken 8/15/2024 2024 by Cindi Ramirez RN  Pain Management Interventions: see MAR  Intervention: Provide Person-Centered Care  Description: Use a family-focused approach to care.  Develop trust and rapport by proactively providing information, encouraging questions, addressing concerns and offering reassurance.  Acknowledge emotional response to hospitalization.  Recognize and utilize personal coping strategies.  Honor spiritual and cultural preferences.  Recent Flowsheet Documentation  Taken 8/15/2024 2000 by Cindi Ramirez RN  Trust Relationship/Rapport:   care explained   choices provided   emotional support provided   empathic listening provided   questions answered   questions encouraged   thoughts/feelings acknowledged  Goal: Readiness for Transition of Care  8/16/2024 0626 by Cindi Ramirez RN  Outcome: Ongoing, Progressing  8/16/2024 0503 by Cindi Ramirez RN  Outcome: Ongoing, Progressing     Problem: Pain Acute  Goal: Acceptable Pain Control and Functional Ability  8/16/2024 0626 by Cindi Ramirez  RN  Outcome: Ongoing, Progressing  8/16/2024 0503 by Cindi Ramirez RN  Outcome: Ongoing, Progressing  Intervention: Prevent or Manage Pain  Description: Evaluate pain level, effect of treatment and patient response at regular intervals.  Minimize painful stimuli; coordinate care and adjust environment (e.g., light, noise, unnecessary movement); promote sleep/rest.  Match pharmacologic analgesia to severity and type of pain mechanism (e.g., neuropathic, muscle, inflammatory); consider multimodal approach (e.g., nonopioid, opioid, adjuvant).  Provide medication at regular intervals; titrate to patient response; premedicate for painful procedures.  Manage breakthrough pain with additional doses; consider rotation or switching medication.  Monitor for signs of substance tolerance (increased dose to reach desired effect, decreased effect with same dose).  Manage medication-induced effects, such as constipation, nausea, pruritus, urinary retention, somnolence and dizziness.  Provide multimodal interventions, such as as physical activity, therapeutic exercise, yoga, TENS (transcutaneous electrical nerve stimulation) and manual therapy.  Train in functional activity modifications, such as body mechanics, posture, ergonomics, energy conservation and activity pacing.  Consider addition of complementary or alternative therapy, such as acupuncture, hypnosis or therapeutic touch.  Recent Flowsheet Documentation  Taken 8/16/2024 0400 by Cindi Ramirez RN  Medication Review/Management: medications reviewed  Taken 8/16/2024 0207 by Cindi Ramirez RN  Medication Review/Management: medications reviewed  Taken 8/16/2024 0000 by Cindi Ramirez RN  Medication Review/Management: medications reviewed  Taken 8/15/2024 2200 by Cindi Ramirez RN  Medication Review/Management: medications reviewed  Taken 8/15/2024 2000 by Cindi Ramirez RN  Medication Review/Management: medications reviewed  Intervention: Develop Pain Management  Plan  Description: Acknowledge patient as the expert in pain self-management.  Use a consistent, validated tool for pain assessment; include function and quality of life.  Evaluate risk for opioid use and dependence.  Set pain management goals; determine acceptable level of discomfort to allow for maximal functioning.  Determine [id number] pain management plan, including both pharmacologic and nonpharmacologic measures; integrate management of chronic (persistent) pain.  Identify and integrate past successful treatment measures, if able.  Encourage patient and caregiver involvement in pain assessment, interventions and safety measures.  Re-evaluate plan regularly.  Recent Flowsheet Documentation  Taken 8/15/2024 2024 by Cindi Ramirez RN  Pain Management Interventions: see MAR  Intervention: Optimize Psychosocial Wellbeing  Description: Facilitate patient’s self-control over pain by providing pain information and allowing choices in treatment.  Consider and address emotional response to pain.  Explore and promote use of coping strategies; address barriers to successful coping.  Evaluate and assist with psychosocial, cultural and spiritual factors impacting pain.  Modify pain perception using techniques, such as distraction, mindfulness, guided imagery, meditation or music.  Assess for risk factors for developing chronic pain, such as depression, fear, pain avoidance and pain catastrophizing.  Consider referral for ongoing coping support, such as education, relaxation training and role of thoughts.  Recent Flowsheet Documentation  Taken 8/15/2024 2000 by Cindi Ramirez RN  Diversional Activities: television     Problem: Infection  Goal: Absence of Infection Signs and Symptoms  8/16/2024 0626 by Cindi Ramirez, RN  Outcome: Ongoing, Progressing  8/16/2024 0503 by Cindi Ramirez, RN  Outcome: Ongoing, Progressing   Goal Outcome Evaluation:

## 2024-08-16 NOTE — H&P
"    [facility] Medicine Services  HISTORY AND PHYSICAL    Patient Name: Francisco Javier Ortega  : 1970  MRN: [id number]  Primary Care Physician: Ronal Joe APRN    Subjective   Subjective     Chief Complaint:abdominal pain    HPI:  Francisco Javier Ortega is a 54 y.o. male who  has a past medical history of COPD (chronic obstructive pulmonary disease), Sleep apnea, Syncope, and Tachycardia. who presented to [place] ED with 3 days of epigastric pain, nausea and vomiting that had subsided. He is taking Maunjaro, but also had some corn and then some chinese food and unsure which is the culprit. He was found to have wither an ileus or early SBO on CT and was transferred to [place] for further evaluation.   He last had a BM 2 days ago he has had no bleeding, he denies fever, but has had chills and sweats he thinks related to the pain. His urine has been dark and :thick\" He is also worried about his umbilical hernia, but it has not changed in this episode of illness.  Review of Systems   Patient denies weight loss, headaches, changes in vision, fever, chills, sore throat, shortness of breath, cough, joint pain, rash, itching, numbness/tingling, weakness or bleeding.    Otherwise complete ROS is negative except as mentioned in the HPI.    Personal History         PMH: He  has a past medical history of COPD (chronic obstructive pulmonary disease), Sleep apnea, Syncope, and Tachycardia.   PSxH: He  has a past surgical history that includes Plantar fascia surgery; Carpal tunnel release; and Avulsion toenail plate.         FH: His family history includes COPD in his father; Cancer in his maternal grandfather; Diabetes in his father and mother; Heart disease in his brother and maternal grandmother.   SH: He  reports that he quit smoking about 6 years ago. His smoking use included cigarettes. He has never used smokeless tobacco. He reports current alcohol use of about 1.0 standard drink of alcohol per week. " He reports that he does not use drugs.     Medications:  albuterol sulfate HFA, atorvastatin, coenzyme Q10, furosemide, metoprolol tartrate, tiotropium bromide-olodaterol, and traMADol    Allergies   Allergen Reactions    Penicillins Unknown (See Comments)     Not sure       Objective   Objective     Vital Signs:   Temp:  [98 °F (36.7 °C)-98.5 °F (36.9 °C)] 98.5 °F (36.9 °C)  Heart Rate:  [79-89] 84  Resp:  [16] 16  BP: (115-136)/(83-93) 130/88    Constitutional: Awake, alert, interactive and pleasant  Eyes: clear sclerae, no conjunctival injection  HENT: NCAT, mucous membranes moist  Neck: no masses or lymphadenopathy, trachea midline  Respiratory: good breath sounds bilaterally, respirations unlabored  Cardiovascular: RRR, no murmurs appreciated, palpable peripheral pulses  Abdomen:  distended, tympanic, quiet , tender no rebound or guarding.  Musculoskeletal: No peripheral edema, clubbing or cyanosis  Neurologic: Oriented x 3,                       Strength symmetric in all extremities                     Cranial Nerves grossly intact, speech clear  Skin: No rashes or jaundice  Psychiatric: Appropriate mood, insight      Result Review:  I have personally reviewed the results from the time of this admission   to 8/15/2024 20:01 EDT and agree with these findings:  [x]  Laboratory  [x]  Microbiology  [x]  Radiology  []  EKG/Telemetry   []  Cardiology/Vascular   []  Pathology  [x]  Old records  []  Other:  Most notable findings include: hemoconcentrated, CT personally reviewed      LAB RESULTS:      Lab 08/15/24  0948   WBC 10.26   HEMOGLOBIN 16.2   HEMATOCRIT 47.3   PLATELETS 218   NEUTROS ABS 7.87*   IMMATURE GRANS (ABS) 0.02   LYMPHS ABS 1.46   MONOS ABS 0.71   EOS ABS 0.16   MCV 91.1         Lab 08/15/24  0948   SODIUM 137   POTASSIUM 3.9   CHLORIDE 101   CO2 25.6   ANION GAP 10.4   BUN 13   CREATININE 1.00   EGFR 88.3   GLUCOSE 110*   CALCIUM 9.2         Lab 08/15/24  0948   TOTAL PROTEIN 7.9   ALBUMIN 4.4  "  GLOBULIN 3.5   ALT (SGPT) 20   AST (SGOT) 31   BILIRUBIN 1.8*   ALK PHOS 69   LIPASE 22         Lab 08/15/24  0948   HSTROP T 14                 Brief Urine Lab Results  (Last result in the past 365 days)        Color   Clarity   Blood   Leuk Est   Nitrite   Protein   CREAT   Urine HCG        08/15/24 1007 Dark Yellow   Clear   Negative   Negative   Negative   Trace                 No results found for: \"COVID19\"    CT Abdomen Pelvis With Contrast    Result Date: 8/15/2024  CT ANGIOGRAM CHEST PULMONARY EMBOLISM, CT ABDOMEN PELVIS W CONTRAST Date of Exam: 8/15/2024 11:04 AM EDT Indication: epigastric abd pain. Comparison: None available. Technique: CTA of the chest was performed after the uneventful intravenous administration of 90 mL Isovue-300. Reconstructed coronal and sagittal images were also obtained. In addition, a 3-D volume rendered image was created for interpretation. Automated exposure control and iterative reconstruction methods were used. Axial CT images were obtained of the abdomen and pelvis following the uneventful intravenous administration of above contrast. Reconstructed coronal and sagittal images were also obtained. Automated exposure control and iterative construction methods were used. Findings: CTA chest: Normal pulmonary arteries without pulmonary embolism. No significant coronary artery calcification. No thoracic adenopathy. Unremarkable appearance of the chest wall soft tissues. Normal appearance of the airways. There are couple scattered tiny and subcentimeter calcified granulomas including a 9 mm partially calcified granuloma in the right upper lobe. There is mild centrilobular and paraseptal emphysema. No focal consolidation or evidence of active infectious or inflammatory process. No pleural effusion. No pneumothorax. Unremarkable appearance of the osseous structures. CT abdomen and pelvis: Unremarkable appearance of the liver, gallbladder, bile ducts, spleen, pancreas, adrenal " glands, kidneys, ureters, bladder, prostate, and seminal vesicles. There are multiple mildly dilated air and fluid-filled loops of proximal and mid small bowel measuring up to 4.0 cm in diameter (series 900 image 43). There is no discrete transition point; there appears to be mild, gradual return to normal caliber small  bowel at the distal ileum. There is a short 3 cm segment of nondilated, narrowed/decompressed small bowel in the central lower abdomen (series 2 image 112, series 901 image 71); the appearance of this is nonspecific and there appears to be distended fluid-filled loops upstream and downstream of this segment. No inflammatory change of the terminal ileum. There is some mesenteric hyperemia. There is a small amount of simple-appearing nonorganized fluid in the right lower quadrant in the lower paracolic gutter and among some small bowel loops (series 2 image 117). There is scattered gas within the colon. There is a small amount of fluid in the right colon. There is a small amount of stool in the sigmoid colon. Normal appendix. Nondilated stomach containing a small amount of ingested material. No pneumoperitoneum. No evidence of bowel ischemia. Normal appearance of the vasculature. No lymphadenopathy. There is a small fat-containing umbilical hernia.. No acute or suspicious bony findings.     Impression: Impression: No evidence of pulmonary embolism. No acute intrathoracic findings. Multiple distended and mildly dilated gas and fluid-filled loops of mid jejunum without discrete transition point. There is a small amount of free fluid in the right lower quadrant which is presumably reactive to this process. Gas and a small amount of fluid/stool remains within the colon. The findings are compatible with early ileus versus partial small bowel obstruction. No evidence of bowel ischemia or perforation. Details above. There is evidence of emphysema. Correlate with patient history and risk factors and please  assess if the patient meets criteria for routine low dose CT lung cancer screening. Multiple scattered small pulmonary granulomata. Electronically Signed: Vaibhav Tapia MD  8/15/2024 11:59 AM EDT  Workstation ID: [id number]    CT Angiogram Chest Pulmonary Embolism    Result Date: 8/15/2024  CT ANGIOGRAM CHEST PULMONARY EMBOLISM, CT ABDOMEN PELVIS W CONTRAST Date of Exam: 8/15/2024 11:04 AM EDT Indication: epigastric abd pain. Comparison: None available. Technique: CTA of the chest was performed after the uneventful intravenous administration of 90 mL Isovue-300. Reconstructed coronal and sagittal images were also obtained. In addition, a 3-D volume rendered image was created for interpretation. Automated exposure control and iterative reconstruction methods were used. Axial CT images were obtained of the abdomen and pelvis following the uneventful intravenous administration of above contrast. Reconstructed coronal and sagittal images were also obtained. Automated exposure control and iterative construction methods were used. Findings: CTA chest: Normal pulmonary arteries without pulmonary embolism. No significant coronary artery calcification. No thoracic adenopathy. Unremarkable appearance of the chest wall soft tissues. Normal appearance of the airways. There are couple scattered tiny and subcentimeter calcified granulomas including a 9 mm partially calcified granuloma in the right upper lobe. There is mild centrilobular and paraseptal emphysema. No focal consolidation or evidence of active infectious or inflammatory process. No pleural effusion. No pneumothorax. Unremarkable appearance of the osseous structures. CT abdomen and pelvis: Unremarkable appearance of the liver, gallbladder, bile ducts, spleen, pancreas, adrenal glands, kidneys, ureters, bladder, prostate, and seminal vesicles. There are multiple mildly dilated air and fluid-filled loops of proximal and mid small bowel measuring up to 4.0 cm in diameter  (series 900 image 43). There is no discrete transition point; there appears to be mild, gradual return to normal caliber small  bowel at the distal ileum. There is a short 3 cm segment of nondilated, narrowed/decompressed small bowel in the central lower abdomen (series 2 image 112, series 901 image 71); the appearance of this is nonspecific and there appears to be distended fluid-filled loops upstream and downstream of this segment. No inflammatory change of the terminal ileum. There is some mesenteric hyperemia. There is a small amount of simple-appearing nonorganized fluid in the right lower quadrant in the lower paracolic gutter and among some small bowel loops (series 2 image 117). There is scattered gas within the colon. There is a small amount of fluid in the right colon. There is a small amount of stool in the sigmoid colon. Normal appendix. Nondilated stomach containing a small amount of ingested material. No pneumoperitoneum. No evidence of bowel ischemia. Normal appearance of the vasculature. No lymphadenopathy. There is a small fat-containing umbilical hernia.. No acute or suspicious bony findings.     Impression: Impression: No evidence of pulmonary embolism. No acute intrathoracic findings. Multiple distended and mildly dilated gas and fluid-filled loops of mid jejunum without discrete transition point. There is a small amount of free fluid in the right lower quadrant which is presumably reactive to this process. Gas and a small amount of fluid/stool remains within the colon. The findings are compatible with early ileus versus partial small bowel obstruction. No evidence of bowel ischemia or perforation. Details above. There is evidence of emphysema. Correlate with patient history and risk factors and please assess if the patient meets criteria for routine low dose CT lung cancer screening. Multiple scattered small pulmonary granulomata. Electronically Signed: Vaibhav Tapia MD  8/15/2024 11:59 AM EDT   Workstation ID: [id number]    XR Chest 1 View    Result Date: 8/15/2024  XR CHEST 1 VW Date of Exam: 8/15/2024 10:01 AM EDT Indication: nv Comparison: 10/12/2010. Findings: Heart and pulmonary vessels appear within normal limits. Lung fields are clear of acute infiltrates or effusions. There is no pneumothorax.     Impression: Impression: No acute process. Electronically Signed: Lizbeth Walden MD  8/15/2024 10:15 AM EDT  Workstation ID: [id number]         The patient has started, but not completed, their COVID-19 vaccination series.    Assessment & Plan   Assessment / Plan       Ileus    COPD (chronic obstructive pulmonary disease)    PEDRO (obstructive sleep apnea)    Class 2 obesity due to excess calories with body mass index (BMI) of 36.0 to 36.9 in adult      Assessment & Plan:  Francisco Javier Ortega is a 54 y.o. male who  has a past medical history of COPD, Sleep apnea, Syncope, and Tachycardia. who presented to [place] ED with 3 days of epigastric pain, nausea and vomiting that had subsided. He is taking Maunjaro, but also had some chinese food and unsure which is the culprit. He was found to have wither an ileus or early SBO on CT and was transferred to [place] for further evaluation.     SBO vs Ileus  -cont IV hydration, monitor cserial exams, consult surgery if no improvement  -PPI  -GI PCR  -HOLD MAunjaro- last dose was 8/12/24    COPD  -duonebs PRN    PEDRO  -CPAP if needed    Obesity BMI 36  Umbilical hernia    Pulmonary nodules had been followed by Dr Denilson Randle  -measuring 9mm now,  pulm nodule clinic    ALSO never had a colonoscopy which he has plans for in the near future    VTE Prophylaxis:HEP SQ    CODE STATUS:FULL CODE     Expected Discharge  Expected Discharge Date: 8/19/2024; Expected Discharge Time:     Electronically signed by Myra Domínguez MD 08/15/24 20:01 EDT

## 2024-08-16 NOTE — CASE MANAGEMENT/SOCIAL WORK
Discharge Planning Assessment  [facility]     Patient Name: Francisco Javier Ortega  MRN: [id number]  Today's Date: 8/16/2024    Admit Date: 8/15/2024    Plan: home   Discharge Needs Assessment       Row Name 08/16/24 1406       Living Environment    People in Home spouse    Current Living Arrangements home    Potentially Unsafe Housing Conditions none    In the past 12 months has the electric, gas, oil, or water company threatened to shut off services in your home? No    Primary Care Provided by self    Provides Primary Care For no one    Family Caregiver if Needed spouse    Quality of Family Relationships helpful;involved;supportive    Able to Return to Prior Arrangements yes       Resource/Environmental Concerns    Resource/Environmental Concerns none       Transportation Needs    In the past 12 months, has lack of transportation kept you from medical appointments or from getting medications? no    In the past 12 months, has lack of transportation kept you from meetings, work, or from getting things needed for daily living? No       Food Insecurity    Within the past 12 months, you worried that your food would run out before you got the money to buy more. Never true    Within the past 12 months, the food you bought just didn't last and you didn't have money to get more. Never true       Transition Planning    Patient/Family Anticipates Transition to home with family    Patient/Family Anticipated Services at Transition none    Transportation Anticipated family or friend will provide       Discharge Needs Assessment    Readmission Within the Last 30 Days no previous admission in last 30 days    Equipment Currently Used at Home cpap    Concerns to be Addressed no discharge needs identified;denies needs/concerns at this time                   Discharge Plan       Row Name 08/16/24 1407       Plan    Plan home    Patient/Family in Agreement with Plan yes    Plan Comments Met with patient at the bedside to initiate discharge  planning. Patient lives with his wife in a house in [place]. He is independent with ADLs and has a CPAP. Patient denies any home health or home oxygen. Patient has [place] Medicaid and prefers to fill scripts at the [facility] in [place]. Patient's goal at discharge is to return home. Wife will transport. CM will continue to follow.    Final Discharge Disposition Code 01 - home or self-care                  Continued Care and Services - Admitted Since 8/15/2024    No active coordination exists for this encounter.       Expected Discharge Date and Time       Expected Discharge Date Expected Discharge Time    Aug 19, 2024            Demographic Summary       Row Name 08/16/24 1404       General Information    Admission Type inpatient    Arrived From home    Referral Source physician    Reason for Consult discharge planning    Preferred Language English    General Information Comments PCP Ronal Joe       Contact Information    Permission Granted to Share Info With family/designee    Contact Information Comments Katlyn Ortega (Spouse)  [phone] (Mobile)                   Functional Status       Row Name 08/16/24 1405       Functional Status    Usual Activity Tolerance excellent    Current Activity Tolerance good       Functional Status, IADL    Medications independent    Meal Preparation independent    Housekeeping independent    Laundry independent    Shopping independent       Mental Status    General Appearance WDL WDL       Mental Status Summary    Recent Changes in Mental Status/Cognitive Functioning no changes                   Psychosocial    No documentation.                  Abuse/Neglect    No documentation.                  Legal    No documentation.                  Substance Abuse    No documentation.                  Patient Forms    No documentation.                     Yasmin Montilla RN

## 2024-08-16 NOTE — CONSULTS
Patient Name:  Francisco Javier Ortega  YOB: 1970  [id number]       Patient Care Team:  Ronal Joe APRN as PCP - General (Family Medicine)  Gerhard Bradley DO as Consulting Physician (Cardiology)      General Surgery Consult Note     Date of Consultation: 08/16/24    Referring Physician - Allen Noel MD    Reason for Consult -small bowel obstruction    Subjective     I have been asked to see  Francisco Javier Ortega , a 54 y.o. male in consultation for small bowel obstruction from Dr. Noel.  The patient has had multiple days of abdominal pain associated with nausea and vomiting and last bowel movement on 8/13/2024.  The patient presented to the emergency room for persistence of symptoms and a CT scan was performed showing ileus versus early small bowel obstruction.  The patient is currently passing flatus.  No nausea or vomiting overnight.  No fevers or chills.      Allergy:   Allergies   Allergen Reactions    Penicillins Unknown (See Comments)     Not sure       Medications:  heparin (porcine), 5,000 Units, Subcutaneous, Q8H  metoprolol tartrate, 25 mg, Oral, Q12H      dextrose 5 % and sodium chloride 0.45 % with KCl 20 mEq/L, 100 mL/hr, Last Rate: 100 mL/hr (08/16/24 0616)      No current facility-administered medications on file prior to encounter.     Current Outpatient Medications on File Prior to Encounter   Medication Sig    HYDROcodone-acetaminophen (NORCO) 5-325 MG per tablet Take 1 tablet by mouth Every 6 (Six) Hours As Needed for Moderate Pain. Pt takes daily    simvastatin (ZOCOR) 5 MG tablet Take 1 tablet by mouth Every Night. Pt takes sometime. It makes his muscles cramp.    atorvastatin (LIPITOR) 20 MG tablet Take 20 mg by mouth Daily. (Patient not taking: Reported on 8/16/2024)    coenzyme Q10 100 MG capsule Take 100 mg by mouth Daily.    Cyanocobalamin (Vitamin B 12) 100 MCG lozenge Take  by mouth.    furosemide (LASIX) 20 MG tablet Take 1 tablet by mouth Daily.    metoprolol tartrate  (LOPRESSOR) 25 MG tablet Take 25 mg by mouth Every 12 (Twelve) Hours.    tiotropium bromide-olodaterol (STIOLTO RESPIMAT) 2.5-2.5 MCG/ACT aerosol solution inhaler Inhale 2 puffs Daily.    traMADol (ULTRAM) 50 MG tablet Take 50 mg by mouth Every 8 (Eight) Hours As Needed.    TRAZODONE 100MG/5ML LIQUID Take  by mouth.    VENTOLIN  (90 Base) MCG/ACT inhaler Inhale 2 puffs Every 4 (Four) Hours As Needed for Wheezing or Shortness of Air.    vitamin B-12 (CYANOCOBALAMIN) 1000 MCG tablet Take 1 tablet by mouth Daily.       PMHx:   Past Medical History:   Diagnosis Date    COPD (chronic obstructive pulmonary disease)     Sleep apnea     Syncope     Tachycardia        Past Surgical History:  Past Surgical History:   Procedure Laterality Date    AVULSION TOENAIL PLATE      CARPAL TUNNEL RELEASE      PLANTAR FASCIA SURGERY          Family History:   Family History   Problem Relation Age of Onset    Diabetes Mother     COPD Father     Diabetes Father     Heart disease Brother     Heart disease Maternal Grandmother     Cancer Maternal Grandfather         Social History:   Social History     Socioeconomic History    Marital status:    Tobacco Use    Smoking status: Former     Current packs/day: 0.00     Types: Cigarettes     Quit date: 2017     Years since quittin.7    Smokeless tobacco: Never   Vaping Use    Vaping status: Never Used   Substance and Sexual Activity    Alcohol use: Yes     Alcohol/week: 1.0 standard drink of alcohol     Types: 1 Cans of beer per week     Comment: occ    Drug use: No    Sexual activity: Defer         Review of Systems       Constitutional: No fevers, chills or malaise   Eyes: Denies visual changes    Cardiovascular: Denies chest pain, palpitations   Pulmonary: Denies cough or shortness of breath   Abdominal/ GI: See HPI    Genitourinary: Denies dysuria or hematuria   Musculoskeletal: Denies any but chronic joint aches, pains or deformities   Psychiatric: No recent mood  "changes   Neurologic: No paresthesias or loss of function          Objective     Physical Exam:      Vital Signs  BP 97/71 (BP Location: Left arm, Patient Position: Lying)   Pulse 69   Temp 97.8 °F (36.6 °C) (Oral)   Resp 18   Ht 172.7 cm (68\")   Wt 110 kg (243 lb)   SpO2 97%   BMI 36.95 kg/m²     Intake/Output Summary (Last 24 hours) at 8/16/2024 0909  Last data filed at 8/15/2024 1053  Gross per 24 hour   Intake 500 ml   Output --   Net 500 ml         Physical Exam:    Head: Normocephalic, atraumatic.   Eyes: Pupils equal, round, react to light and accommodation.   Mouth: Oral mucosa without lesions  Neck: No masses, lymphadenopathy or carotid bruits bilaterally  CV: Rhythm and rate regular, no murmurs, rubs or gallops  Lungs: Clear to auscultation bilaterally  Abdomen: Bowel sounds positive, soft, mild distention, mild tenderness in right upper quadrant/epigastric area without rebound or guarding  Groin : No obvious hernias bilaterally  Extremities:  No cyanosis, clubbing or edema bilaterally  Lymphatics: No abnormal lymphadenopathy appreciated  Neurologic: No gross deficits      Results Review: I have personally reviewed all of the recent lab and imaging results available at this time.   Creatinine 1.15  LFTs within normal limits  WBC 7.2  Hemoglobin 14  Platelets 184,000    CT abdomen pelvis 8/15/2024:  Impression:  No evidence of pulmonary embolism. No acute intrathoracic findings.     Multiple distended and mildly dilated gas and fluid-filled loops of mid jejunum without discrete transition point. There is a small amount of free fluid in the right lower quadrant which is presumably reactive to this process. Gas and a small amount of   fluid/stool remains within the colon. The findings are compatible with early ileus versus partial small bowel obstruction. No evidence of bowel ischemia or perforation. Details above.     There is evidence of emphysema. Correlate with patient history and risk factors and " please assess if the patient meets criteria for routine low dose CT lung cancer screening.     Multiple scattered small pulmonary granulomata.         Assessment & Plan     Assessment and Plan:    Patient with ileus secondary to gastroenteritis versus partial small bowel obstruction.  Small bowel follow-through to be performed today.  Will follow results to determine next Epson treatment.  N.p.o., IV fluid for now.      I discussed the patient's findings and my recommendations with the patient and/or family, as well as the primary team     Jacob Hannon MD  08/16/24  09:09 EDT

## 2024-08-17 PROCEDURE — 99232 SBSQ HOSP IP/OBS MODERATE 35: CPT | Performed by: INTERNAL MEDICINE

## 2024-08-17 PROCEDURE — 25010000002 HEPARIN (PORCINE) PER 1000 UNITS: Performed by: INTERNAL MEDICINE

## 2024-08-17 RX ADMIN — METOPROLOL TARTRATE 25 MG: 25 TABLET, FILM COATED ORAL at 09:12

## 2024-08-17 RX ADMIN — HEPARIN SODIUM 5000 UNITS: 5000 INJECTION INTRAVENOUS; SUBCUTANEOUS at 15:57

## 2024-08-17 RX ADMIN — HEPARIN SODIUM 5000 UNITS: 5000 INJECTION INTRAVENOUS; SUBCUTANEOUS at 05:24

## 2024-08-17 RX ADMIN — POTASSIUM CHLORIDE, DEXTROSE MONOHYDRATE AND SODIUM CHLORIDE 100 ML/HR: 150; 5; 450 INJECTION, SOLUTION INTRAVENOUS at 01:33

## 2024-08-17 RX ADMIN — HEPARIN SODIUM 5000 UNITS: 5000 INJECTION INTRAVENOUS; SUBCUTANEOUS at 22:12

## 2024-08-17 RX ADMIN — POTASSIUM CHLORIDE, DEXTROSE MONOHYDRATE AND SODIUM CHLORIDE 100 ML/HR: 150; 5; 450 INJECTION, SOLUTION INTRAVENOUS at 15:58

## 2024-08-17 RX ADMIN — METOPROLOL TARTRATE 25 MG: 25 TABLET, FILM COATED ORAL at 22:12

## 2024-08-17 RX ADMIN — TRAZODONE HYDROCHLORIDE 50 MG: 50 TABLET ORAL at 22:12

## 2024-08-17 NOTE — PROGRESS NOTES
[facility] Medicine Services  PROGRESS NOTE    Patient Name: Francisco Javier Ortega  : 1970  MRN: [id number]    Date of Admission: 8/15/2024  Primary Care Physician: Ronal Joe APRN    Subjective   Subjective     CC: Follow-up SBO/ileus    HPI: No acute events overnight, patient does have some mild abdominal discomfort, has not had a BM since yesterday.      Objective   Objective     Vital Signs:   Temp:  [96.6 °F (35.9 °C)-98 °F (36.7 °C)] 97.2 °F (36.2 °C)  Heart Rate:  [71-86] 71  Resp:  [16-18] 18  BP: (103-131)/(69-86) 105/69     Physical Exam:  Constitutional: No acute distress, awake, alert  HENT: NCAT, mucous membranes moist  Respiratory: Clear to auscultation bilaterally, respiratory effort normal   Cardiovascular: RRR, no murmurs, rubs, or gallops  Gastrointestinal: Positive bowel sounds, soft, nontender, nondistended  Musculoskeletal: No bilateral ankle edema  Psychiatric: Appropriate affect, cooperative  Neurologic: Oriented x 3, nonfocal  Skin: No rashes      Results Reviewed:  LAB RESULTS:      Lab 24  0346 08/15/24  0948   WBC 7.23 10.26   HEMOGLOBIN 14.0 16.2   HEMATOCRIT 42.2 47.3   PLATELETS 184 218   NEUTROS ABS 3.89 7.87*   IMMATURE GRANS (ABS) 0.02 0.02   LYMPHS ABS 2.33 1.46   MONOS ABS 0.60 0.71   EOS ABS 0.34 0.16   MCV 91.7 91.1         Lab 24  0346 08/15/24  0948   SODIUM 139 137   POTASSIUM 4.7 3.9   CHLORIDE 105 101   CO2 27.0 25.6   ANION GAP 7.0 10.4   BUN 12 13   CREATININE 1.15 1.00   EGFR 74.7 88.3   GLUCOSE 88 110*   CALCIUM 8.5* 9.2         Lab 24  0346 08/15/24  0948   TOTAL PROTEIN 5.9* 7.9   ALBUMIN 3.8 4.4   GLOBULIN 2.1 3.5   ALT (SGPT) 24 20   AST (SGOT) 22 31   BILIRUBIN 1.1 1.8*   ALK PHOS 58 69   LIPASE  --  22         Lab 08/15/24  0948   HSTROP T 14                 Brief Urine Lab Results  (Last result in the past 365 days)        Color   Clarity   Blood   Leuk Est   Nitrite   Protein   CREAT   Urine HCG        08/15/24  1007 Dark Yellow   Clear   Negative   Negative   Negative   Trace                   Microbiology Results Abnormal       None            FL Small Bowel Follow Through Single-Contrast    Result Date: 8/16/2024  FL SMALL BOWEL FOLLOW THROUGH SINGLE-CONTRAST Date of Exam: 8/16/2024 10:06 AM EDT Indication: assess for SBO, please use gastrografin.   Comparison: Abdomen pelvis CT scan 8/15/2024. Technique:   image of the abdomen was obtained. A single contrast study using thin barium was performed. Radiologic images of the small bowel were obtained at timed intervals, including fluoroscopic spot imaging. Fluoroscopic Time: 0 Number of Images: 8 Findings:  imaging reveals a gaseous abdomen. There are multiple loops of prominent small bowel visible in the left and right upper quadrants. 30-minute film shows contrast opacification of multiple loops of mildly prominent proximal, and mid small bowel measuring approximately 3.5 cm in diameter. 1 hour film shows contrast opacification of the multiple loops of mildly prominent proximal, and mid, as well as several loops of grossly normal-appearing distal small bowel. Contrast was seen reaching the colon at 1 hour. No high-grade strictures, obstructing lesions were seen. Multiple loops of mildly prominent proximal to mid small bowel with multiple loops of grossly normal-appearing distal small bowel, contrast seen reaching the colon at 1 hour likely  represents a partial mid to distal small bowel obstruction. There was no evidence of complete small bowel obstruction.     Impression: Impression: Multiple loops of mildly dilated proximal to mid small bowel with multiple loops of decompressed distal small bowel, contrast seen reaching the colon at 1 hour likely represents a partial mid to distal small bowel obstruction. There was no evidence of complete small bowel obstruction. Report dictated by: Marla Stanley PA-c  I have personally reviewed this case and agree with the  findings above: Electronically Signed: Castro Ramírez MD  8/16/2024 5:33 PM EDT  Workstation ID: [id number]    XR Abdomen KUB    Result Date: 8/16/2024  XR ABDOMEN KUB Date of Exam: 8/16/2024 3:24 AM EDT Indication: eval ileus Follow up for SBO Comparison: None available. Findings: There are multiple dilated loops of small bowel in the central abdomen consistent with small bowel obstruction. There is no gross free air. There is contrast in the urinary bladder.     Impression: Impression: Small bowel obstruction. Electronically Signed: Dameon Chicas MD  8/16/2024 4:55 AM EDT  Workstation ID: [id number]    CT Abdomen Pelvis With Contrast    Result Date: 8/15/2024  CT ANGIOGRAM CHEST PULMONARY EMBOLISM, CT ABDOMEN PELVIS W CONTRAST Date of Exam: 8/15/2024 11:04 AM EDT Indication: epigastric abd pain. Comparison: None available. Technique: CTA of the chest was performed after the uneventful intravenous administration of 90 mL Isovue-300. Reconstructed coronal and sagittal images were also obtained. In addition, a 3-D volume rendered image was created for interpretation. Automated exposure control and iterative reconstruction methods were used. Axial CT images were obtained of the abdomen and pelvis following the uneventful intravenous administration of above contrast. Reconstructed coronal and sagittal images were also obtained. Automated exposure control and iterative construction methods were used. Findings: CTA chest: Normal pulmonary arteries without pulmonary embolism. No significant coronary artery calcification. No thoracic adenopathy. Unremarkable appearance of the chest wall soft tissues. Normal appearance of the airways. There are couple scattered tiny and subcentimeter calcified granulomas including a 9 mm partially calcified granuloma in the right upper lobe. There is mild centrilobular and paraseptal emphysema. No focal consolidation or evidence of active infectious or inflammatory process. No pleural effusion. No  pneumothorax. Unremarkable appearance of the osseous structures. CT abdomen and pelvis: Unremarkable appearance of the liver, gallbladder, bile ducts, spleen, pancreas, adrenal glands, kidneys, ureters, bladder, prostate, and seminal vesicles. There are multiple mildly dilated air and fluid-filled loops of proximal and mid small bowel measuring up to 4.0 cm in diameter (series 900 image 43). There is no discrete transition point; there appears to be mild, gradual return to normal caliber small  bowel at the distal ileum. There is a short 3 cm segment of nondilated, narrowed/decompressed small bowel in the central lower abdomen (series 2 image 112, series 901 image 71); the appearance of this is nonspecific and there appears to be distended fluid-filled loops upstream and downstream of this segment. No inflammatory change of the terminal ileum. There is some mesenteric hyperemia. There is a small amount of simple-appearing nonorganized fluid in the right lower quadrant in the lower paracolic gutter and among some small bowel loops (series 2 image 117). There is scattered gas within the colon. There is a small amount of fluid in the right colon. There is a small amount of stool in the sigmoid colon. Normal appendix. Nondilated stomach containing a small amount of ingested material. No pneumoperitoneum. No evidence of bowel ischemia. Normal appearance of the vasculature. No lymphadenopathy. There is a small fat-containing umbilical hernia.. No acute or suspicious bony findings.     Impression: Impression: No evidence of pulmonary embolism. No acute intrathoracic findings. Multiple distended and mildly dilated gas and fluid-filled loops of mid jejunum without discrete transition point. There is a small amount of free fluid in the right lower quadrant which is presumably reactive to this process. Gas and a small amount of fluid/stool remains within the colon. The findings are compatible with early ileus versus partial  small bowel obstruction. No evidence of bowel ischemia or perforation. Details above. There is evidence of emphysema. Correlate with patient history and risk factors and please assess if the patient meets criteria for routine low dose CT lung cancer screening. Multiple scattered small pulmonary granulomata. Electronically Signed: Vaibhav Tapia MD  8/15/2024 11:59 AM EDT  Workstation ID: [id number]    CT Angiogram Chest Pulmonary Embolism    Result Date: 8/15/2024  CT ANGIOGRAM CHEST PULMONARY EMBOLISM, CT ABDOMEN PELVIS W CONTRAST Date of Exam: 8/15/2024 11:04 AM EDT Indication: epigastric abd pain. Comparison: None available. Technique: CTA of the chest was performed after the uneventful intravenous administration of 90 mL Isovue-300. Reconstructed coronal and sagittal images were also obtained. In addition, a 3-D volume rendered image was created for interpretation. Automated exposure control and iterative reconstruction methods were used. Axial CT images were obtained of the abdomen and pelvis following the uneventful intravenous administration of above contrast. Reconstructed coronal and sagittal images were also obtained. Automated exposure control and iterative construction methods were used. Findings: CTA chest: Normal pulmonary arteries without pulmonary embolism. No significant coronary artery calcification. No thoracic adenopathy. Unremarkable appearance of the chest wall soft tissues. Normal appearance of the airways. There are couple scattered tiny and subcentimeter calcified granulomas including a 9 mm partially calcified granuloma in the right upper lobe. There is mild centrilobular and paraseptal emphysema. No focal consolidation or evidence of active infectious or inflammatory process. No pleural effusion. No pneumothorax. Unremarkable appearance of the osseous structures. CT abdomen and pelvis: Unremarkable appearance of the liver, gallbladder, bile ducts, spleen, pancreas, adrenal glands, kidneys,  ureters, bladder, prostate, and seminal vesicles. There are multiple mildly dilated air and fluid-filled loops of proximal and mid small bowel measuring up to 4.0 cm in diameter (series 900 image 43). There is no discrete transition point; there appears to be mild, gradual return to normal caliber small  bowel at the distal ileum. There is a short 3 cm segment of nondilated, narrowed/decompressed small bowel in the central lower abdomen (series 2 image 112, series 901 image 71); the appearance of this is nonspecific and there appears to be distended fluid-filled loops upstream and downstream of this segment. No inflammatory change of the terminal ileum. There is some mesenteric hyperemia. There is a small amount of simple-appearing nonorganized fluid in the right lower quadrant in the lower paracolic gutter and among some small bowel loops (series 2 image 117). There is scattered gas within the colon. There is a small amount of fluid in the right colon. There is a small amount of stool in the sigmoid colon. Normal appendix. Nondilated stomach containing a small amount of ingested material. No pneumoperitoneum. No evidence of bowel ischemia. Normal appearance of the vasculature. No lymphadenopathy. There is a small fat-containing umbilical hernia.. No acute or suspicious bony findings.     Impression: Impression: No evidence of pulmonary embolism. No acute intrathoracic findings. Multiple distended and mildly dilated gas and fluid-filled loops of mid jejunum without discrete transition point. There is a small amount of free fluid in the right lower quadrant which is presumably reactive to this process. Gas and a small amount of fluid/stool remains within the colon. The findings are compatible with early ileus versus partial small bowel obstruction. No evidence of bowel ischemia or perforation. Details above. There is evidence of emphysema. Correlate with patient history and risk factors and please assess if the  patient meets criteria for routine low dose CT lung cancer screening. Multiple scattered small pulmonary granulomata. Electronically Signed: Vaibhav Tapia MD  8/15/2024 11:59 AM EDT  Workstation ID: [id number]    XR Chest 1 View    Result Date: 8/15/2024  XR CHEST 1 VW Date of Exam: 8/15/2024 10:01 AM EDT Indication: nv Comparison: 10/12/2010. Findings: Heart and pulmonary vessels appear within normal limits. Lung fields are clear of acute infiltrates or effusions. There is no pneumothorax.     Impression: Impression: No acute process. Electronically Signed: Lizbeth Walden MD  8/15/2024 10:15 AM EDT  Workstation ID: [id number]         Current medications:  Scheduled Meds:heparin (porcine), 5,000 Units, Subcutaneous, Q8H  metoprolol tartrate, 25 mg, Oral, Q12H  traZODone, 50 mg, Oral, Nightly      Continuous Infusions:dextrose 5 % and sodium chloride 0.45 % with KCl 20 mEq/L, 100 mL/hr, Last Rate: 100 mL/hr (08/17/24 0133)      PRN Meds:.  ipratropium-albuterol    ketorolac    Morphine    ondansetron    sodium chloride    Assessment & Plan   Assessment & Plan     Active Hospital Problems    Diagnosis  POA    **Ileus [K56.7]  Yes    COPD (chronic obstructive pulmonary disease) [J44.9]  Yes    PEDRO (obstructive sleep apnea) [G47.33]  Yes    Class 2 obesity due to excess calories with body mass index (BMI) of 36.0 to 36.9 in adult [E66.09, Z68.36]  Not Applicable      Resolved Hospital Problems   No resolved problems to display.        Brief Hospital Course to date:  Francisco Javier Ortega is a 54 y.o. male with past medical history of COPD, PEDRO, syncope and tachycardia, currently on Mounjaro who presented to the ED with 3 days of epigastric abdominal pain with associated nausea and vomiting admitted with early ileus versus partial SBO     Early ileus vs partial SBO  -CT abdomen pelvis shows multiple distended and mildly dilated gas and fluid-filled loops compatible with early ileus versus partial SBO  -General Surgery following,  he is s/p SBFT that did confirm partial mid to distal SBO  -Continue supportive therapy, IV fluids, antiemetics CLD  -Recommend continue holding Mounjaro, last dose 8/12/2024  -Continue PPI     COPD  Not in exacerbation, continue duonebs PRN     PEDRO  -CPAP if needed     Obesity BMI 36  Umbilical hernia     Pulmonary nodules   -Previously followed by Dr Denilson Randle  -measuring 9mm now, FU pulm nodule clinic     Patient needs surveillance colonoscopy, has never had one, will discuss with PCP     Expected Discharge Location and Transportation: Home  Expected Discharge   Expected Discharge Date: 8/19/2024; Expected Discharge Time:      VTE Prophylaxis:  Pharmacologic VTE prophylaxis orders are present.         AM-PAC 6 Clicks Score (PT): 24 (08/16/24 2006)    CODE STATUS:   Code Status and Medical Interventions: CPR (Attempt to Resuscitate); Full Support   Ordered at: 08/15/24 2138     Code Status (Patient has no pulse and is not breathing):    CPR (Attempt to Resuscitate)     Medical Interventions (Patient has pulse or is breathing):    Full Support       Allen Noel MD  08/17/24

## 2024-08-17 NOTE — PROGRESS NOTES
"Patient Name:  Francisco Javier Ortega  YOB: 1970  [id number]    Surgery Progress Note    Date of visit: 8/17/2024    Subjective   Patient with some nausea yesterday but no vomiting.  No nausea or vomiting this morning.  Multiple loose bowel movements since small bowel follow-through yesterday.  No formed bowel movements.  No fevers or chills.  Improved abdominal pain.         Objective       /69 (BP Location: Left arm, Patient Position: Lying)   Pulse 71   Temp 97.2 °F (36.2 °C) (Oral)   Resp 18   Ht 172.7 cm (68\")   Wt 110 kg (243 lb)   SpO2 96%   BMI 36.95 kg/m²   No intake or output data in the 24 hours ending 08/17/24 0855    CV:  Rhythm regular and rate regular  L:  Clear to auscultation bilaterally  Abd:  Bowel sounds positive, soft, mild distention, nontender  Ext:  No cyanosis, clubbing, edema    Recent labs and imaging that are back at this time have been reviewed.          Assessment & Plan     Patient with ileus versus partial small bowel obstruction.  No evidence of complete bowel obstruction on small bowel follow-through.  Advance to GI soft diet.  Okay for DC planning once tolerates this diet.        Jacob Hannon MD  8/17/2024  08:55 EDT      "

## 2024-08-17 NOTE — PLAN OF CARE
Problem: Adult Inpatient Plan of Care  Goal: Plan of Care Review  Outcome: Ongoing, Progressing  Goal: Patient-Specific Goal (Individualized)  Outcome: Ongoing, Progressing  Goal: Absence of Hospital-Acquired Illness or Injury  Outcome: Ongoing, Progressing  Intervention: Identify and Manage Fall Risk  Description: Perform standard risk assessment on admission using a validated tool or comprehensive approach appropriate to the patient; reassess fall risk frequently, with change in status or transfer to another level of care.  Communicate fall injury risk to interprofessional healthcare team.  Determine need for increased observation, equipment and environmental modification, such as low bed, signage and supportive, nonskid footwear.  Adjust safety measures to individual developmental age, stage and identified risk factors.  Reinforce the importance of safety and physical activity with patient and family.  Perform regular intentional rounding to assess need for position change, pain assessment and personal needs, including assistance with toileting.  Recent Flowsheet Documentation  Taken 8/17/2024 0400 by Cindi Ramirez, RN  Safety Promotion/Fall Prevention:   activity supervised   assistive device/personal items within reach   clutter free environment maintained   fall prevention program maintained   lighting adjusted   mobility aid in reach   nonskid shoes/slippers when out of bed   room organization consistent   safety round/check completed  Taken 8/17/2024 0200 by Cindi Ramirez, RN  Safety Promotion/Fall Prevention:   activity supervised   assistive device/personal items within reach   clutter free environment maintained   fall prevention program maintained   lighting adjusted   mobility aid in reach   nonskid shoes/slippers when out of bed   safety round/check completed   room organization consistent  Taken 8/17/2024 0000 by Cindi Ramirez, RN  Safety Promotion/Fall Prevention:   activity supervised    assistive device/personal items within reach   clutter free environment maintained   fall prevention program maintained   lighting adjusted   mobility aid in reach   nonskid shoes/slippers when out of bed   room organization consistent   safety round/check completed  Taken 8/16/2024 2200 by Cindi Ramirez RN  Safety Promotion/Fall Prevention:   activity supervised   assistive device/personal items within reach   clutter free environment maintained   fall prevention program maintained   lighting adjusted   mobility aid in reach   nonskid shoes/slippers when out of bed   room organization consistent   safety round/check completed  Taken 8/16/2024 2006 by Cindi Ramirez RN  Safety Promotion/Fall Prevention:   activity supervised   assistive device/personal items within reach   clutter free environment maintained   fall prevention program maintained   lighting adjusted   mobility aid in reach   nonskid shoes/slippers when out of bed   room organization consistent   safety round/check completed  Intervention: Prevent Skin Injury  Description: Perform a screening for skin injury risk, such as pressure or moisture associated skin damage on admission and at regular intervals throughout hospital stay.  Keep all areas of skin (especially folds) clean and dry.  Maintain adequate skin hydration.  Relieve and redistribute pressure and protect bony prominences; implement measures based on patient-specific risk factors.  Match turning and repositioning schedule to clinical condition.  Encourage weight shift frequently; assist with reposition if unable to complete independently.  Float heels off bed; avoid pressure on the Achilles tendon.  Keep skin free from extended contact with medical devices.  Encourage functional activity and mobility, as early as tolerated.  Use aids (e.g., slide boards, mechanical lift) during transfer.  Recent Flowsheet Documentation  Taken 8/17/2024 0400 by Cindi Ramirez, RN  Body Position: position  changed independently  Taken 8/17/2024 0200 by Cindi Ramirez RN  Body Position: position changed independently  Taken 8/17/2024 0000 by Cindi Ramirez RN  Body Position: position changed independently  Taken 8/16/2024 2200 by Cindi Ramirez RN  Body Position: position changed independently  Taken 8/16/2024 2006 by Cindi Ramirez RN  Body Position: position changed independently  Skin Protection:   adhesive use limited   tubing/devices free from skin contact   transparent dressing maintained   skin-to-skin areas padded   skin-to-device areas padded  Intervention: Prevent and Manage VTE (Venous Thromboembolism) Risk  Description: Assess for VTE (venous thromboembolism) risk.  Encourage and assist with early ambulation.  Initiate and maintain compression or other therapy, as indicated, based on identified risk in accordance with organizational protocol and provider order.  Encourage both active and passive leg exercises while in bed, if unable to ambulate.  Recent Flowsheet Documentation  Taken 8/17/2024 0400 by Cindi Ramirez RN  Activity Management: up ad rosa  Taken 8/17/2024 0200 by Cindi Ramirez RN  Activity Management: up ad rosa  Taken 8/17/2024 0000 by Cindi Ramirez RN  Activity Management: up ad rosa  Taken 8/16/2024 2200 by Cindi Ramirez RN  Activity Management: up ad rosa  Taken 8/16/2024 2006 by Cindi Ramirez RN  Activity Management: up ad rosa  VTE Prevention/Management: other (see comments)  Intervention: Prevent Infection  Description: Maintain skin and mucous membrane integrity; promote hand, oral and pulmonary hygiene.  Optimize fluid balance, nutrition, sleep and glycemic control to maximize infection resistance.  Identify potential sources of infection early to prevent or mitigate progression of infection (e.g., wound, lines, devices).  Evaluate ongoing need for invasive devices; remove promptly when no longer indicated.  Recent Flowsheet Documentation  Taken 8/16/2024 2006 by Ashley  ISABELLA Puente  Infection Prevention:   environmental surveillance performed   equipment surfaces disinfected   hand hygiene promoted   personal protective equipment utilized  Goal: Optimal Comfort and Wellbeing  Outcome: Ongoing, Progressing  Intervention: Provide Person-Centered Care  Description: Use a family-focused approach to care.  Develop trust and rapport by proactively providing information, encouraging questions, addressing concerns and offering reassurance.  Acknowledge emotional response to hospitalization.  Recognize and utilize personal coping strategies.  Honor spiritual and cultural preferences.  Recent Flowsheet Documentation  Taken 8/16/2024 2006 by Cindi Ramirez RN  Trust Relationship/Rapport:   care explained   choices provided   empathic listening provided   emotional support provided   questions answered   questions encouraged   thoughts/feelings acknowledged  Goal: Readiness for Transition of Care  Outcome: Ongoing, Progressing     Problem: Pain Acute  Goal: Acceptable Pain Control and Functional Ability  Outcome: Ongoing, Progressing  Intervention: Prevent or Manage Pain  Description: Evaluate pain level, effect of treatment and patient response at regular intervals.  Minimize painful stimuli; coordinate care and adjust environment (e.g., light, noise, unnecessary movement); promote sleep/rest.  Match pharmacologic analgesia to severity and type of pain mechanism (e.g., neuropathic, muscle, inflammatory); consider multimodal approach (e.g., nonopioid, opioid, adjuvant).  Provide medication at regular intervals; titrate to patient response; premedicate for painful procedures.  Manage breakthrough pain with additional doses; consider rotation or switching medication.  Monitor for signs of substance tolerance (increased dose to reach desired effect, decreased effect with same dose).  Manage medication-induced effects, such as constipation, nausea, pruritus, urinary retention, somnolence and  dizziness.  Provide multimodal interventions, such as as physical activity, therapeutic exercise, yoga, TENS (transcutaneous electrical nerve stimulation) and manual therapy.  Train in functional activity modifications, such as body mechanics, posture, ergonomics, energy conservation and activity pacing.  Consider addition of complementary or alternative therapy, such as acupuncture, hypnosis or therapeutic touch.  Recent Flowsheet Documentation  Taken 8/17/2024 0400 by Cindi Ramirez, RN  Medication Review/Management: medications reviewed  Taken 8/17/2024 0200 by Cindi Ramirez RN  Medication Review/Management: medications reviewed  Taken 8/17/2024 0000 by Cindi Ramirez RN  Medication Review/Management: medications reviewed  Taken 8/16/2024 2200 by Cindi Ramirez RN  Medication Review/Management: medications reviewed  Taken 8/16/2024 2006 by Cindi Ramirez RN  Medication Review/Management: medications reviewed  Intervention: Optimize Psychosocial Wellbeing  Description: Facilitate patient’s self-control over pain by providing pain information and allowing choices in treatment.  Consider and address emotional response to pain.  Explore and promote use of coping strategies; address barriers to successful coping.  Evaluate and assist with psychosocial, cultural and spiritual factors impacting pain.  Modify pain perception using techniques, such as distraction, mindfulness, guided imagery, meditation or music.  Assess for risk factors for developing chronic pain, such as depression, fear, pain avoidance and pain catastrophizing.  Consider referral for ongoing coping support, such as education, relaxation training and role of thoughts.  Recent Flowsheet Documentation  Taken 8/16/2024 2006 by Cindi Ramirez RN  Diversional Activities: television     Problem: Infection  Goal: Absence of Infection Signs and Symptoms  Outcome: Ongoing, Progressing   Goal Outcome Evaluation:

## 2024-08-18 ENCOUNTER — READMISSION MANAGEMENT (OUTPATIENT)
Dept: CALL CENTER | Facility: HOSPITAL | Age: 54
End: 2024-08-18
Payer: MEDICAID

## 2024-08-18 VITALS
HEART RATE: 77 BPM | BODY MASS INDEX: 36.83 KG/M2 | OXYGEN SATURATION: 92 % | WEIGHT: 243 LBS | SYSTOLIC BLOOD PRESSURE: 130 MMHG | TEMPERATURE: 97.7 F | DIASTOLIC BLOOD PRESSURE: 79 MMHG | RESPIRATION RATE: 20 BRPM | HEIGHT: 68 IN

## 2024-08-18 PROCEDURE — 25010000002 HEPARIN (PORCINE) PER 1000 UNITS: Performed by: INTERNAL MEDICINE

## 2024-08-18 PROCEDURE — 99232 SBSQ HOSP IP/OBS MODERATE 35: CPT | Performed by: INTERNAL MEDICINE

## 2024-08-18 RX ADMIN — POTASSIUM CHLORIDE, DEXTROSE MONOHYDRATE AND SODIUM CHLORIDE 100 ML/HR: 150; 5; 450 INJECTION, SOLUTION INTRAVENOUS at 03:08

## 2024-08-18 RX ADMIN — METOPROLOL TARTRATE 25 MG: 25 TABLET, FILM COATED ORAL at 08:23

## 2024-08-18 RX ADMIN — HEPARIN SODIUM 5000 UNITS: 5000 INJECTION INTRAVENOUS; SUBCUTANEOUS at 06:39

## 2024-08-18 NOTE — PLAN OF CARE
Goal Outcome Evaluation:           Problem: Adult Inpatient Plan of Care  Goal: Plan of Care Review  Outcome: Ongoing, Progressing  Goal: Patient-Specific Goal (Individualized)  Outcome: Ongoing, Progressing  Goal: Absence of Hospital-Acquired Illness or Injury  Outcome: Ongoing, Progressing  Intervention: Identify and Manage Fall Risk  Recent Flowsheet Documentation  Taken 8/18/2024 0000 by Maryam Ortega RN  Safety Promotion/Fall Prevention:   activity supervised   clutter free environment maintained   nonskid shoes/slippers when out of bed   room organization consistent   assistive device/personal items within reach   fall prevention program maintained   lighting adjusted   safety round/check completed  Taken 8/17/2024 2014 by Maryam Ortega RN  Safety Promotion/Fall Prevention:   nonskid shoes/slippers when out of bed   safety round/check completed  Intervention: Prevent Skin Injury  Recent Flowsheet Documentation  Taken 8/18/2024 0000 by Maryam Ortega RN  Body Position: position changed independently  Taken 8/17/2024 2014 by Maryam Ortega RN  Body Position: position changed independently  Skin Protection:   adhesive use limited   tubing/devices free from skin contact   transparent dressing maintained   skin-to-skin areas padded   skin-to-device areas padded  Intervention: Prevent and Manage VTE (Venous Thromboembolism) Risk  Recent Flowsheet Documentation  Taken 8/18/2024 0000 by Maryam Ortega RN  Activity Management: activity encouraged  Taken 8/17/2024 2014 by Maryam Ortega RN  Activity Management: up ad rosa  VTE Prevention/Management: (see MAR) other (see comments)  Range of Motion: active ROM (range of motion) encouraged  Intervention: Prevent Infection  Recent Flowsheet Documentation  Taken 8/18/2024 0000 by Maryam Ortega RN  Infection Prevention:   environmental surveillance performed   equipment surfaces disinfected   hand hygiene promoted   rest/sleep promoted   single patient room  provided  Taken 8/17/2024 2014 by Maryam Ortega RN  Infection Prevention:   rest/sleep promoted   hand hygiene promoted   environmental surveillance performed  Goal: Optimal Comfort and Wellbeing  Outcome: Ongoing, Progressing  Intervention: Provide Person-Centered Care  Recent Flowsheet Documentation  Taken 8/17/2024 2014 by Maryam Ortega RN  Trust Relationship/Rapport: care explained  Goal: Readiness for Transition of Care  Outcome: Ongoing, Progressing     Problem: Pain Acute  Goal: Acceptable Pain Control and Functional Ability  Outcome: Ongoing, Progressing  Intervention: Prevent or Manage Pain  Recent Flowsheet Documentation  Taken 8/17/2024 2014 by Maryam Ortega RN  Sensory Stimulation Regulation: care clustered  Intervention: Optimize Psychosocial Wellbeing  Recent Flowsheet Documentation  Taken 8/17/2024 2014 by Maryam Ortega RN  Supportive Measures: active listening utilized  Diversional Activities:   television   smartphone     Problem: Infection  Goal: Absence of Infection Signs and Symptoms  Outcome: Ongoing, Progressing

## 2024-08-18 NOTE — PROGRESS NOTES
"Patient Name:  Francisco Javier Ortega  YOB: 1970  [id number]    Surgery Progress Note    Date of visit: 8/18/2024    Subjective   Intermittent mild crampy abdominal pain.  No nausea or vomiting.  No fevers or chills.  Continues to have diarrhea.         Objective       /91   Pulse 77   Temp 97.1 °F (36.2 °C) (Oral)   Resp 16   Ht 172.7 cm (68\")   Wt 110 kg (243 lb)   SpO2 92%   BMI 36.95 kg/m²     Intake/Output Summary (Last 24 hours) at 8/18/2024 1034  Last data filed at 8/18/2024 0900  Gross per 24 hour   Intake 240 ml   Output --   Net 240 ml       CV:  Rhythm regular and rate regular  L:  Clear to auscultation bilaterally  Abd:  Bowel sounds positive, soft, obese, mild distention, nontender  Ext:  No cyanosis, clubbing, edema    Recent labs and imaging that are back at this time have been reviewed.          Assessment & Plan     Continue current diet.  If recurrent nausea and vomiting or worsening abdominal pain make n.p.o. and will need operative intervention.  If able to tolerate diet okay for DC planning and can follow with me as needed.        Jacob Hannon MD  8/18/2024  10:34 EDT      "

## 2024-08-18 NOTE — DISCHARGE SUMMARY
[facility] Medicine Services  DISCHARGE SUMMARY    Patient Name: Francisco Javier Ortega  : 1970  MRN: [id number]    Date of Admission: 8/15/2024  9:37 AM  Date of Discharge:  2024  Primary Care Physician: Ronal Joe APRN    Consults       Date and Time Order Name Status Description    8/15/2024  9:37 PM Inpatient General Surgery Consult Completed             Hospital Course       Active Hospital Problems    Diagnosis  POA   • **Ileus [K56.7]  Yes   • COPD (chronic obstructive pulmonary disease) [J44.9]  Yes   • PEDRO (obstructive sleep apnea) [G47.33]  Yes   • Class 2 obesity due to excess calories with body mass index (BMI) of 36.0 to 36.9 in adult [E66.09, Z68.36]  Not Applicable      Resolved Hospital Problems   No resolved problems to display.      Hospital Course:  Francisco Javier Ortega is a 54 y.o. male with past medical history of COPD, PEDRO, syncope and tachycardia, currently on Mounjaro who presented to the ED with 3 days of epigastric abdominal pain with associated nausea and vomiting admitted with early ileus versus partial SBO     Early ileus vs partial SBO  -CT abdomen pelvis showed multiple distended and mildly dilated gas and fluid-filled loops compatible with early ileus versus partial SBO  -General Surgery followed, he is s/p SBFT that did confirm partial mid to distal SBO  -s/p supportive therapy with IV fluids, antiemetics, he has since tolerated his GI soft diet and has had a BM   -Recommend continue holding Mounjaro, last dose 2024  -Continue PPI  -Patient has been advised to follow-up with neurosurgery as needed     COPD  Not in exacerbation, s/p duonebs PRN     PEDRO  -CPAP if needed     Obesity BMI 36  Umbilical hernia     Pulmonary nodules   -Previously followed by Dr Denilson Randle  -measuring 9mm now, FU pulm nodule clinic     Patient needs surveillance colonoscopy, has never had one, will discuss with PCP    Discharge Follow Up Recommendations for outpatient  labs/diagnostics:  Follow-up with PCP in 1 week  Follow-up with pulmonary nodule clinic  Follow-up with Dr. Hannon as needed    Day of Discharge     HPI: No acute events overnight, patient tolerating his GI soft diet , but is yet to have a BM       Review of Systems  Gen- No fevers, chills  CV- No chest pain, palpitations  Resp- No cough, dyspnea  GI- No N/V/D, abd pain     Vital Signs:   Temp:  [97 °F (36.1 °C)-98.4 °F (36.9 °C)] 97.7 °F (36.5 °C)  Heart Rate:  [77-89] 77  Resp:  [16-20] 20  BP: (118-143)/(77-92) 130/79      Physical Exam:  Constitutional: No acute distress, awake, alert  HENT: NCAT, mucous membranes moist  Respiratory: Clear to auscultation bilaterally, respiratory effort normal   Cardiovascular: RRR, no murmurs, rubs, or gallops  Gastrointestinal: Obese, faint bowel sounds, moderately tender  Musculoskeletal: No bilateral ankle edema  Psychiatric: Appropriate affect, cooperative  Neurologic: Oriented x 3, nonfocal  Skin: No rashes    Pertinent  and/or Most Recent Results     LAB RESULTS:      Lab 08/16/24  0346 08/15/24  0948   WBC 7.23 10.26   HEMOGLOBIN 14.0 16.2   HEMATOCRIT 42.2 47.3   PLATELETS 184 218   NEUTROS ABS 3.89 7.87*   IMMATURE GRANS (ABS) 0.02 0.02   LYMPHS ABS 2.33 1.46   MONOS ABS 0.60 0.71   EOS ABS 0.34 0.16   MCV 91.7 91.1         Lab 08/16/24  0346 08/15/24  0948   SODIUM 139 137   POTASSIUM 4.7 3.9   CHLORIDE 105 101   CO2 27.0 25.6   ANION GAP 7.0 10.4   BUN 12 13   CREATININE 1.15 1.00   EGFR 74.7 88.3   GLUCOSE 88 110*   CALCIUM 8.5* 9.2         Lab 08/16/24  0346 08/15/24  0948   TOTAL PROTEIN 5.9* 7.9   ALBUMIN 3.8 4.4   GLOBULIN 2.1 3.5   ALT (SGPT) 24 20   AST (SGOT) 22 31   BILIRUBIN 1.1 1.8*   ALK PHOS 58 69   LIPASE  --  22         Lab 08/15/24  0948   HSTROP T 14                 Brief Urine Lab Results  (Last result in the past 365 days)        Color   Clarity   Blood   Leuk Est   Nitrite   Protein   CREAT   Urine HCG        08/15/24 1007 Dark Yellow   Clear    Negative   Negative   Negative   Trace                 Microbiology Results (last 10 days)       ** No results found for the last 240 hours. **            FL Small Bowel Follow Through Single-Contrast    Result Date: 8/16/2024  FL SMALL BOWEL FOLLOW THROUGH SINGLE-CONTRAST Date of Exam: 8/16/2024 10:06 AM EDT Indication: assess for SBO, please use gastrografin.   Comparison: Abdomen pelvis CT scan 8/15/2024. Technique:   image of the abdomen was obtained. A single contrast study using thin barium was performed. Radiologic images of the small bowel were obtained at timed intervals, including fluoroscopic spot imaging. Fluoroscopic Time: 0 Number of Images: 8 Findings:  imaging reveals a gaseous abdomen. There are multiple loops of prominent small bowel visible in the left and right upper quadrants. 30-minute film shows contrast opacification of multiple loops of mildly prominent proximal, and mid small bowel measuring approximately 3.5 cm in diameter. 1 hour film shows contrast opacification of the multiple loops of mildly prominent proximal, and mid, as well as several loops of grossly normal-appearing distal small bowel. Contrast was seen reaching the colon at 1 hour. No high-grade strictures, obstructing lesions were seen. Multiple loops of mildly prominent proximal to mid small bowel with multiple loops of grossly normal-appearing distal small bowel, contrast seen reaching the colon at 1 hour likely  represents a partial mid to distal small bowel obstruction. There was no evidence of complete small bowel obstruction.     Impression: Multiple loops of mildly dilated proximal to mid small bowel with multiple loops of decompressed distal small bowel, contrast seen reaching the colon at 1 hour likely represents a partial mid to distal small bowel obstruction. There was no evidence of complete small bowel obstruction. Report dictated by: Marla Stanley PA-c  I have personally reviewed this case and agree with  the findings above: Electronically Signed: Castro Ramírez MD  8/16/2024 5:33 PM EDT  Workstation ID: [id number]    XR Abdomen KUB    Result Date: 8/16/2024  XR ABDOMEN KUB Date of Exam: 8/16/2024 3:24 AM EDT Indication: eval ileus Follow up for SBO Comparison: None available. Findings: There are multiple dilated loops of small bowel in the central abdomen consistent with small bowel obstruction. There is no gross free air. There is contrast in the urinary bladder.     Impression: Small bowel obstruction. Electronically Signed: Dameon Chicas MD  8/16/2024 4:55 AM EDT  Workstation ID: [id number]    CT Abdomen Pelvis With Contrast    Result Date: 8/15/2024  CT ANGIOGRAM CHEST PULMONARY EMBOLISM, CT ABDOMEN PELVIS W CONTRAST Date of Exam: 8/15/2024 11:04 AM EDT Indication: epigastric abd pain. Comparison: None available. Technique: CTA of the chest was performed after the uneventful intravenous administration of 90 mL Isovue-300. Reconstructed coronal and sagittal images were also obtained. In addition, a 3-D volume rendered image was created for interpretation. Automated exposure control and iterative reconstruction methods were used. Axial CT images were obtained of the abdomen and pelvis following the uneventful intravenous administration of above contrast. Reconstructed coronal and sagittal images were also obtained. Automated exposure control and iterative construction methods were used. Findings: CTA chest: Normal pulmonary arteries without pulmonary embolism. No significant coronary artery calcification. No thoracic adenopathy. Unremarkable appearance of the chest wall soft tissues. Normal appearance of the airways. There are couple scattered tiny and subcentimeter calcified granulomas including a 9 mm partially calcified granuloma in the right upper lobe. There is mild centrilobular and paraseptal emphysema. No focal consolidation or evidence of active infectious or inflammatory process. No pleural effusion. No  pneumothorax. Unremarkable appearance of the osseous structures. CT abdomen and pelvis: Unremarkable appearance of the liver, gallbladder, bile ducts, spleen, pancreas, adrenal glands, kidneys, ureters, bladder, prostate, and seminal vesicles. There are multiple mildly dilated air and fluid-filled loops of proximal and mid small bowel measuring up to 4.0 cm in diameter (series 900 image 43). There is no discrete transition point; there appears to be mild, gradual return to normal caliber small  bowel at the distal ileum. There is a short 3 cm segment of nondilated, narrowed/decompressed small bowel in the central lower abdomen (series 2 image 112, series 901 image 71); the appearance of this is nonspecific and there appears to be distended fluid-filled loops upstream and downstream of this segment. No inflammatory change of the terminal ileum. There is some mesenteric hyperemia. There is a small amount of simple-appearing nonorganized fluid in the right lower quadrant in the lower paracolic gutter and among some small bowel loops (series 2 image 117). There is scattered gas within the colon. There is a small amount of fluid in the right colon. There is a small amount of stool in the sigmoid colon. Normal appendix. Nondilated stomach containing a small amount of ingested material. No pneumoperitoneum. No evidence of bowel ischemia. Normal appearance of the vasculature. No lymphadenopathy. There is a small fat-containing umbilical hernia.. No acute or suspicious bony findings.     Impression: No evidence of pulmonary embolism. No acute intrathoracic findings. Multiple distended and mildly dilated gas and fluid-filled loops of mid jejunum without discrete transition point. There is a small amount of free fluid in the right lower quadrant which is presumably reactive to this process. Gas and a small amount of fluid/stool remains within the colon. The findings are compatible with early ileus versus partial small bowel  obstruction. No evidence of bowel ischemia or perforation. Details above. There is evidence of emphysema. Correlate with patient history and risk factors and please assess if the patient meets criteria for routine low dose CT lung cancer screening. Multiple scattered small pulmonary granulomata. Electronically Signed: Vaibhav Tapia MD  8/15/2024 11:59 AM EDT  Workstation ID: [id number]    CT Angiogram Chest Pulmonary Embolism    Result Date: 8/15/2024  CT ANGIOGRAM CHEST PULMONARY EMBOLISM, CT ABDOMEN PELVIS W CONTRAST Date of Exam: 8/15/2024 11:04 AM EDT Indication: epigastric abd pain. Comparison: None available. Technique: CTA of the chest was performed after the uneventful intravenous administration of 90 mL Isovue-300. Reconstructed coronal and sagittal images were also obtained. In addition, a 3-D volume rendered image was created for interpretation. Automated exposure control and iterative reconstruction methods were used. Axial CT images were obtained of the abdomen and pelvis following the uneventful intravenous administration of above contrast. Reconstructed coronal and sagittal images were also obtained. Automated exposure control and iterative construction methods were used. Findings: CTA chest: Normal pulmonary arteries without pulmonary embolism. No significant coronary artery calcification. No thoracic adenopathy. Unremarkable appearance of the chest wall soft tissues. Normal appearance of the airways. There are couple scattered tiny and subcentimeter calcified granulomas including a 9 mm partially calcified granuloma in the right upper lobe. There is mild centrilobular and paraseptal emphysema. No focal consolidation or evidence of active infectious or inflammatory process. No pleural effusion. No pneumothorax. Unremarkable appearance of the osseous structures. CT abdomen and pelvis: Unremarkable appearance of the liver, gallbladder, bile ducts, spleen, pancreas, adrenal glands, kidneys, ureters,  bladder, prostate, and seminal vesicles. There are multiple mildly dilated air and fluid-filled loops of proximal and mid small bowel measuring up to 4.0 cm in diameter (series 900 image 43). There is no discrete transition point; there appears to be mild, gradual return to normal caliber small  bowel at the distal ileum. There is a short 3 cm segment of nondilated, narrowed/decompressed small bowel in the central lower abdomen (series 2 image 112, series 901 image 71); the appearance of this is nonspecific and there appears to be distended fluid-filled loops upstream and downstream of this segment. No inflammatory change of the terminal ileum. There is some mesenteric hyperemia. There is a small amount of simple-appearing nonorganized fluid in the right lower quadrant in the lower paracolic gutter and among some small bowel loops (series 2 image 117). There is scattered gas within the colon. There is a small amount of fluid in the right colon. There is a small amount of stool in the sigmoid colon. Normal appendix. Nondilated stomach containing a small amount of ingested material. No pneumoperitoneum. No evidence of bowel ischemia. Normal appearance of the vasculature. No lymphadenopathy. There is a small fat-containing umbilical hernia.. No acute or suspicious bony findings.     Impression: No evidence of pulmonary embolism. No acute intrathoracic findings. Multiple distended and mildly dilated gas and fluid-filled loops of mid jejunum without discrete transition point. There is a small amount of free fluid in the right lower quadrant which is presumably reactive to this process. Gas and a small amount of fluid/stool remains within the colon. The findings are compatible with early ileus versus partial small bowel obstruction. No evidence of bowel ischemia or perforation. Details above. There is evidence of emphysema. Correlate with patient history and risk factors and please assess if the patient meets criteria for  routine low dose CT lung cancer screening. Multiple scattered small pulmonary granulomata. Electronically Signed: Vaibhav Tapia MD  8/15/2024 11:59 AM EDT  Workstation ID: [id number]    XR Chest 1 View    Result Date: 8/15/2024  XR CHEST 1 VW Date of Exam: 8/15/2024 10:01 AM EDT Indication: nv Comparison: 10/12/2010. Findings: Heart and pulmonary vessels appear within normal limits. Lung fields are clear of acute infiltrates or effusions. There is no pneumothorax.     Impression: No acute process. Electronically Signed: Lizbeth Walden MD  8/15/2024 10:15 AM EDT  Workstation ID: [id number]         Plan for Follow-up of Pending Labs/Results:     Discharge Details        Discharge Medications        Changes to Medications        Instructions Start Date   vitamin B-12 1000 MCG tablet  Commonly known as: CYANOCOBALAMIN  What changed: Another medication with the same name was removed. Continue taking this medication, and follow the directions you see here.   1,000 mcg, Oral, Daily             Continue These Medications        Instructions Start Date   coenzyme Q10 100 MG capsule   100 mg, Oral, Daily      furosemide 20 MG tablet  Commonly known as: LASIX   20 mg, Oral, Daily      HYDROcodone-acetaminophen 5-325 MG per tablet  Commonly known as: NORCO   1 tablet, Oral, Every 6 Hours PRN, Pt takes daily      metoprolol tartrate 25 MG tablet  Commonly known as: LOPRESSOR   25 mg, Oral, Every 12 Hours Scheduled      simvastatin 5 MG tablet  Commonly known as: ZOCOR   5 mg, Oral, Nightly, Pt takes sometime. It makes his muscles cramp.      tiotropium bromide-olodaterol 2.5-2.5 MCG/ACT aerosol solution inhaler  Commonly known as: STIOLTO RESPIMAT   2 puffs, Inhalation, Daily - RT      traMADol 50 MG tablet  Commonly known as: ULTRAM   50 mg, Oral, Every 8 Hours PRN      traZODone 50 MG tablet  Commonly known as: DESYREL   50 mg, Oral, Nightly      Ventolin  (90 Base) MCG/ACT inhaler  Generic drug: albuterol sulfate HFA    2 puffs, Inhalation, Every 4 Hours PRN               Allergies   Allergen Reactions   • Penicillins Unknown (See Comments)     Not sure       Discharge Disposition:home  Home or Self Care    Diet:  Hospital:  Diet Order   Procedures   • Diet: Gastrointestinal; Fiber-Restricted; Texture: Soft to Chew (NDD 3); Soft to Chew: Whole Meat; Fluid Consistency: Thin (IDDSI 0)        Activity:as tolerated      Restrictions or Other Recommendations:  none       CODE STATUS:    Code Status and Medical Interventions: CPR (Attempt to Resuscitate); Full Support   Ordered at: 08/15/24 2138     Code Status (Patient has no pulse and is not breathing):    CPR (Attempt to Resuscitate)     Medical Interventions (Patient has pulse or is breathing):    Full Support       No future appointments.    Additional Instructions for the Follow-ups that You Need to Schedule       Ambulatory Referral to Lung Nodule Clinic - [place]   As directed            Allen oNel MD  08/18/24      Time Spent on Discharge:  I spent  20  minutes on this discharge activity which included: face-to-face encounter with the patient, reviewing the data in the system, coordination of the care with the nursing staff as well as consultants, documentation, and entering orders.

## 2024-08-18 NOTE — PROGRESS NOTES
[facility] Medicine Services  PROGRESS NOTE    Patient Name: Francisco Javier Ortega  : 1970  MRN: [id number]    Date of Admission: 8/15/2024  Primary Care Physician: Ronal Joe APRN    Subjective   Subjective     CC:follow-up SBO/ileus      HPI: No acute events overnight, patient tolerating his GI soft diet , but is yet to have a BM    Objective   Objective     Vital Signs:   Temp:  [97 °F (36.1 °C)-98.4 °F (36.9 °C)] 97.1 °F (36.2 °C)  Heart Rate:  [77-89] 77  Resp:  [16-18] 16  BP: (118-143)/(77-92) 125/91     Physical Exam:  Constitutional: No acute distress, awake, alert  HENT: NCAT, mucous membranes moist  Respiratory: Clear to auscultation bilaterally, respiratory effort normal   Cardiovascular: RRR, no murmurs, rubs, or gallops  Gastrointestinal: Obese, faint bowel sounds, moderately tender  Musculoskeletal: No bilateral ankle edema  Psychiatric: Appropriate affect, cooperative  Neurologic: Oriented x 3, nonfocal  Skin: No rashes      Results Reviewed:  LAB RESULTS:      Lab 24  0346 08/15/24  0948   WBC 7.23 10.26   HEMOGLOBIN 14.0 16.2   HEMATOCRIT 42.2 47.3   PLATELETS 184 218   NEUTROS ABS 3.89 7.87*   IMMATURE GRANS (ABS) 0.02 0.02   LYMPHS ABS 2.33 1.46   MONOS ABS 0.60 0.71   EOS ABS 0.34 0.16   MCV 91.7 91.1         Lab 24  0346 08/15/24  0948   SODIUM 139 137   POTASSIUM 4.7 3.9   CHLORIDE 105 101   CO2 27.0 25.6   ANION GAP 7.0 10.4   BUN 12 13   CREATININE 1.15 1.00   EGFR 74.7 88.3   GLUCOSE 88 110*   CALCIUM 8.5* 9.2         Lab 24  0346 08/15/24  0948   TOTAL PROTEIN 5.9* 7.9   ALBUMIN 3.8 4.4   GLOBULIN 2.1 3.5   ALT (SGPT) 24 20   AST (SGOT) 22 31   BILIRUBIN 1.1 1.8*   ALK PHOS 58 69   LIPASE  --  22         Lab 08/15/24  0948   HSTROP T 14                 Brief Urine Lab Results  (Last result in the past 365 days)        Color   Clarity   Blood   Leuk Est   Nitrite   Protein   CREAT   Urine HCG        08/15/24 1007 Dark Yellow   Clear    Negative   Negative   Negative   Trace                   Microbiology Results Abnormal       None            FL Small Bowel Follow Through Single-Contrast    Result Date: 8/16/2024  FL SMALL BOWEL FOLLOW THROUGH SINGLE-CONTRAST Date of Exam: 8/16/2024 10:06 AM EDT Indication: assess for SBO, please use gastrografin.   Comparison: Abdomen pelvis CT scan 8/15/2024. Technique:   image of the abdomen was obtained. A single contrast study using thin barium was performed. Radiologic images of the small bowel were obtained at timed intervals, including fluoroscopic spot imaging. Fluoroscopic Time: 0 Number of Images: 8 Findings:  imaging reveals a gaseous abdomen. There are multiple loops of prominent small bowel visible in the left and right upper quadrants. 30-minute film shows contrast opacification of multiple loops of mildly prominent proximal, and mid small bowel measuring approximately 3.5 cm in diameter. 1 hour film shows contrast opacification of the multiple loops of mildly prominent proximal, and mid, as well as several loops of grossly normal-appearing distal small bowel. Contrast was seen reaching the colon at 1 hour. No high-grade strictures, obstructing lesions were seen. Multiple loops of mildly prominent proximal to mid small bowel with multiple loops of grossly normal-appearing distal small bowel, contrast seen reaching the colon at 1 hour likely  represents a partial mid to distal small bowel obstruction. There was no evidence of complete small bowel obstruction.     Impression: Impression: Multiple loops of mildly dilated proximal to mid small bowel with multiple loops of decompressed distal small bowel, contrast seen reaching the colon at 1 hour likely represents a partial mid to distal small bowel obstruction. There was no evidence of complete small bowel obstruction. Report dictated by: Marla Stanley PA-c  I have personally reviewed this case and agree with the findings above:  Electronically Signed: Castro Ramírez MD  8/16/2024 5:33 PM EDT  Workstation ID: [id number]         Current medications:  Scheduled Meds:heparin (porcine), 5,000 Units, Subcutaneous, Q8H  metoprolol tartrate, 25 mg, Oral, Q12H  traZODone, 50 mg, Oral, Nightly      Continuous Infusions:dextrose 5 % and sodium chloride 0.45 % with KCl 20 mEq/L, 100 mL/hr, Last Rate: 100 mL/hr (08/18/24 0308)      PRN Meds:.  ipratropium-albuterol    ketorolac    Morphine    ondansetron    sodium chloride    Assessment & Plan   Assessment & Plan     Active Hospital Problems    Diagnosis  POA    **Ileus [K56.7]  Yes    COPD (chronic obstructive pulmonary disease) [J44.9]  Yes    PEDRO (obstructive sleep apnea) [G47.33]  Yes    Class 2 obesity due to excess calories with body mass index (BMI) of 36.0 to 36.9 in adult [E66.09, Z68.36]  Not Applicable      Resolved Hospital Problems   No resolved problems to display.        Brief Hospital Course to date:  Francisco Javier Ortega is a 54 y.o. male with past medical history of COPD, PEDRO, syncope and tachycardia, currently on Mounjaro who presented to the ED with 3 days of epigastric abdominal pain with associated nausea and vomiting admitted with early ileus versus partial SBO     Early ileus vs partial SBO  -CT abdomen pelvis shows multiple distended and mildly dilated gas and fluid-filled loops compatible with early ileus versus partial SBO  -General Surgery following, he is s/p SBFT that did confirm partial mid to distal SBO  -Continue supportive therapy, IV fluids, antiemetics, he is currently tolerating GI soft diet but has yet to have a BM  -Recommend continue holding Mounjaro, last dose 8/12/2024  -Continue PPI     COPD  Not in exacerbation, continue duonebs PRN     PEDRO  -CPAP if needed     Obesity BMI 36  Umbilical hernia     Pulmonary nodules   -Previously followed by Dr Denilson Randle  -measuring 9mm now, FU pulm nodule clinic     Patient needs surveillance colonoscopy, has never had one, will  discuss with PCP    Expected Discharge Location and Transportation: Home when okay per general surgery  Expected Discharge   Expected Discharge Date: 8/19/2024; Expected Discharge Time:      VTE Prophylaxis:  Pharmacologic VTE prophylaxis orders are present.         AM-PAC 6 Clicks Score (PT): 24 (08/17/24 2014)    CODE STATUS:   Code Status and Medical Interventions: CPR (Attempt to Resuscitate); Full Support   Ordered at: 08/15/24 2138     Code Status (Patient has no pulse and is not breathing):    CPR (Attempt to Resuscitate)     Medical Interventions (Patient has pulse or is breathing):    Full Support       Allen Noel MD  08/18/24

## 2024-08-19 NOTE — OUTREACH NOTE
Prep Survey      Flowsheet Row Responses   [facility] facility patient discharged from? [facility]   Is LACE score < 7 ? No   Eligibility Readm Mgmt   Discharge diagnosis Ileus   Does the patient have one of the following disease processes/diagnoses(primary or secondary)? Other   Does the patient have Home health ordered? No   Is there a DME ordered? No   Prep survey completed? Yes            Kathleen HANNA - Registered Nurse

## 2024-08-21 ENCOUNTER — READMISSION MANAGEMENT (OUTPATIENT)
Dept: CALL CENTER | Facility: HOSPITAL | Age: 54
End: 2024-08-21
Payer: MEDICAID

## 2024-08-21 LAB
QT INTERVAL: 360 MS
QTC INTERVAL: 430 MS

## 2024-08-21 NOTE — OUTREACH NOTE
Medical Week 1 Survey      Flowsheet Row Responses   [facility] patient discharged from? [place]   Does the patient have one of the following disease processes/diagnoses(primary or secondary)? Other   Week 1 attempt successful? Yes   Call start time 1137   Call end time 1139   Discharge diagnosis Ileus   Meds reviewed with patient/caregiver? Yes   Is the patient having any side effects they believe may be caused by any medication additions or changes? No   Does the patient have all medications ordered at discharge? Yes   Is the patient taking all medications as directed (includes completed medication regime)? Yes   Does the patient have a primary care provider?  Yes   Does the patient have an appointment with their PCP within 7 days of discharge? Yes   Has the patient kept scheduled appointments due by today? Yes   Comments Had f/u up with PCP   Psychosocial issues? No   Did the patient receive a copy of their discharge instructions? Yes   Nursing interventions Reviewed instructions with patient   What is the patient's perception of their health status since discharge? Improving   Is the patient/caregiver able to teach back signs and symptoms related to disease process for when to call PCP? Yes   Is the patient/caregiver able to teach back signs and symptoms related to disease process for when to call 911? Yes   Is the patient/caregiver able to teach back the hierarchy of who to call/visit for symptoms/problems? PCP, Specialist, Home health nurse, Urgent Care, ED, 911 Yes   If the patient is a current smoker, are they able to teach back resources for cessation? Not a smoker   Additional teach back comments No more nausea or vomitting.  Has some pain but he is not over eating.   Week 1 call completed? Yes   Graduated Yes   Graduated/Revoked comments Denies questions or needs at this time   Call end time 1139            Tyra MANCERA - Licensed Nurse

## 2024-08-30 ENCOUNTER — OFFICE VISIT (OUTPATIENT)
Age: 54
End: 2024-08-30
Payer: MEDICAID

## 2024-08-30 VITALS
BODY MASS INDEX: 38.13 KG/M2 | HEART RATE: 76 BPM | WEIGHT: 251.6 LBS | SYSTOLIC BLOOD PRESSURE: 122 MMHG | TEMPERATURE: 98.3 F | HEIGHT: 68 IN | DIASTOLIC BLOOD PRESSURE: 82 MMHG | OXYGEN SATURATION: 95 %

## 2024-08-30 DIAGNOSIS — Z87.891 PERSONAL HISTORY OF SMOKING: Primary | ICD-10-CM

## 2024-08-30 DIAGNOSIS — J44.9 CHRONIC OBSTRUCTIVE PULMONARY DISEASE, UNSPECIFIED COPD TYPE: ICD-10-CM

## 2024-08-30 DIAGNOSIS — G47.33 OSA (OBSTRUCTIVE SLEEP APNEA): ICD-10-CM

## 2024-08-30 RX ORDER — AZITHROMYCIN 250 MG/1
TABLET, FILM COATED ORAL
COMMUNITY
End: 2024-08-30

## 2024-08-30 RX ORDER — SEMAGLUTIDE 0.68 MG/ML
INJECTION, SOLUTION SUBCUTANEOUS
COMMUNITY
End: 2024-08-30

## 2024-08-30 RX ORDER — METFORMIN HCL 500 MG
TABLET, EXTENDED RELEASE 24 HR ORAL
COMMUNITY
End: 2024-08-30

## 2024-08-30 RX ORDER — CHLORHEXIDINE GLUCONATE ORAL RINSE 1.2 MG/ML
SOLUTION DENTAL
COMMUNITY
End: 2024-08-30

## 2024-08-30 RX ORDER — TIRZEPATIDE 2.5 MG/.5ML
5 INJECTION, SOLUTION SUBCUTANEOUS WEEKLY
COMMUNITY

## 2024-08-30 RX ORDER — ONDANSETRON 4 MG/1
TABLET, FILM COATED ORAL
COMMUNITY
End: 2024-08-30

## 2024-08-30 RX ORDER — KETOCONAZOLE 20 MG/ML
SHAMPOO TOPICAL
COMMUNITY
End: 2024-08-30

## 2024-08-30 RX ORDER — METOPROLOL SUCCINATE 25 MG/1
1 TABLET, EXTENDED RELEASE ORAL 2 TIMES DAILY
COMMUNITY

## 2024-08-30 RX ORDER — HYDROXYZINE PAMOATE 25 MG/1
CAPSULE ORAL
COMMUNITY
End: 2024-08-30

## 2024-08-30 RX ORDER — ATORVASTATIN CALCIUM 20 MG/1
1 TABLET, FILM COATED ORAL DAILY
COMMUNITY
End: 2024-08-30

## 2024-08-30 RX ORDER — CLOBETASOL PROPIONATE 0.5 MG/G
OINTMENT TOPICAL
COMMUNITY
End: 2024-08-30

## 2024-08-30 RX ORDER — TAMSULOSIN HYDROCHLORIDE 0.4 MG/1
1 CAPSULE ORAL DAILY
COMMUNITY

## 2024-08-30 RX ORDER — FLUVASTATIN 20 MG/1
CAPSULE ORAL
COMMUNITY
End: 2024-08-30

## 2024-08-30 NOTE — PROGRESS NOTES
[facility] Pulmonary Initial Evaluation    CHIEF COMPLAINT    Pulmonary nodule    Referred by:  WILBER King    HISTORY OF PRESENT ILLNESS    Francisco Javier Ortega is a 54 y.o.male here today for an initial evaluation of a pulmonary nodule that was found On a CT angio chest while seen at [facility] in August.  He is referred to our office for further management.    He was originally referred to our office back in 2018 for a pulmonary nodule and saw Dr. Denilson Randle.  He had multiple subcentimeter nodules and the largest was 7 mm in the right upper lobe.  He had scans in 2019 and 2020 that showed no changes.  These were considered benign and no further imaging was warranted.    He denies any new chemical or environmental exposures in the past.  He denies any respiratory illnesses since his last appointment with Dr. Denilson Randle in 2018.  He denies any exposure to TB.  He denies any breathing difficulties.    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  He denies any breathing difficulties while working.    He denies any sputum production or hemoptysis.  Denies any chest pain or palpitations.  Denies any lower extremity edema or calf tenderness.      He denies any Reflux symptoms.  He denies any sinus or allergy symptoms.  He does deal with a lot of dust when mowing his yard.    He quit smoking 6 years ago and has a [id number] history.    He is accompanied by his family.    He has a history of PEDRO but has not been wearing his CPAP in quite a while.  He states for the most part he sleeps well at nighttime.    Patient Active Problem List   Diagnosis    Ileus    COPD (chronic obstructive pulmonary disease)    PEDRO (obstructive sleep apnea)    Class 2 obesity due to excess calories with body mass index (BMI) of 36.0 to 36.9 in adult       Allergies   Allergen Reactions    Penicillins Unknown (See Comments)      Childhood allergy       Current Outpatient Medications:     coenzyme Q10 100 MG capsule, Take 1 capsule by mouth Daily., Disp: , Rfl:     furosemide (LASIX) 20 MG tablet, Take 1 tablet by mouth Daily., Disp: , Rfl:     HYDROcodone-acetaminophen (NORCO) 5-325 MG per tablet, Take 1 tablet by mouth Daily. Pt takes daily, Disp: , Rfl:     metoprolol succinate XL (TOPROL-XL) 25 MG 24 hr tablet, Take 1 tablet by mouth 2 (Two) Times a Day., Disp: , Rfl:     Mounjaro 2.5 MG/0.5ML solution pen-injector pen, Inject 1 mL under the skin into the appropriate area as directed 1 (One) Time Per Week., Disp: , Rfl:     simvastatin (ZOCOR) 5 MG tablet, Take 1 tablet by mouth Every Night. Pt takes sometime. It makes his muscles cramp., Disp: , Rfl:     tamsulosin (FLOMAX) 0.4 MG capsule 24 hr capsule, Take 1 capsule by mouth Daily., Disp: , Rfl:     traZODone (DESYREL) 50 MG tablet, Take 1 tablet by mouth Every Night., Disp: , Rfl:     vitamin B-12 (CYANOCOBALAMIN) 1000 MCG tablet, Take 1 tablet by mouth Daily., Disp: , Rfl:   MEDICATION LIST AND ALLERGIES REVIEWED.    Social History     Tobacco Use    Smoking status: Former     Current packs/day: 0.00     Types: Cigarettes     Quit date: 2017     Years since quittin.7    Smokeless tobacco: Never   Vaping Use    Vaping status: Never Used   Substance Use Topics    Alcohol use: Yes     Alcohol/week: 1.0 standard drink of alcohol     Types: 1 Cans of beer per week     Comment: occ    Drug use: No       FAMILY AND SOCIAL HISTORY REVIEWED.    Review of Systems   Constitutional:  Negative for activity change, appetite change, fatigue, fever and unexpected weight change.   HENT:  Negative for congestion, postnasal drip, rhinorrhea, sinus pressure, sore throat and voice change.    Eyes:  Negative for visual disturbance.   Respiratory:  Positive for shortness of breath. Negative for cough, chest tightness and wheezing.    Cardiovascular:  Negative for chest pain, palpitations and  "leg swelling.   Gastrointestinal:  Negative for abdominal distention, abdominal pain, nausea and vomiting.   Endocrine: Negative for cold intolerance and heat intolerance.   Genitourinary:  Negative for difficulty urinating and urgency.   Musculoskeletal:  Negative for arthralgias, back pain and neck pain.   Skin:  Negative for color change and pallor.   Allergic/Immunologic: Negative for environmental allergies and food allergies.   Neurological:  Negative for dizziness, syncope, weakness and light-headedness.   Hematological:  Negative for adenopathy. Does not bruise/bleed easily.   Psychiatric/Behavioral:  Negative for agitation and behavioral problems.    .    /82   Pulse 76   Temp 98.3 °F (36.8 °C)   Ht 172.7 cm (68\")   Wt 114 kg (251 lb 9.6 oz)   SpO2 95% Comment: Room air at rest  BMI 38.26 kg/m²     Immunization History   Administered Date(s) Administered    COVID-19 (MODERNA) 1st,2nd,3rd Dose Monovalent 05/05/2021, 06/02/2021    Influenza, Unspecified 01/01/2016, 07/22/2024    flucelvax quad pfs =>4 YRS 10/25/2018       Physical Exam  Vitals and nursing note reviewed.   Constitutional:       Appearance: He is well-developed. He is not diaphoretic.   HENT:      Head: Normocephalic and atraumatic.   Eyes:      Pupils: Pupils are equal, round, and reactive to light.   Neck:      Thyroid: No thyromegaly.   Cardiovascular:      Rate and Rhythm: Normal rate and regular rhythm.      Heart sounds: Normal heart sounds. No murmur heard.     No friction rub. No gallop.   Pulmonary:      Effort: Pulmonary effort is normal. No respiratory distress.      Breath sounds: Normal breath sounds. No wheezing or rales.   Chest:      Chest wall: No tenderness.   Abdominal:      General: Bowel sounds are normal.      Palpations: Abdomen is soft.      Tenderness: There is no abdominal tenderness.   Musculoskeletal:         General: No swelling. Normal range of motion.      Cervical back: Normal range of motion and neck " supple.   Lymphadenopathy:      Cervical: No cervical adenopathy.   Skin:     General: Skin is warm and dry.      Capillary Refill: Capillary refill takes less than 2 seconds.   Neurological:      Mental Status: He is alert and oriented to person, place, and time.   Psychiatric:         Mood and Affect: Mood normal.         Behavior: Behavior normal.           RESULTS    Spirometry Interpretation:  FVC 3.57 82% predicted, FEV1 2.40 70% predicted, FEV1/FVC 67% predicted, TLC 5.84 88% predicted, DLCO 65% predicted, mild obstruction, no restriction and reduced DLCO.    FL Small Bowel Follow Through Single-Contrast    Result Date: 8/16/2024  Impression: Multiple loops of mildly dilated proximal to mid small bowel with multiple loops of decompressed distal small bowel, contrast seen reaching the colon at 1 hour likely represents a partial mid to distal small bowel obstruction. There was no evidence of complete small bowel obstruction. Report dictated by: Marla Stanley PA-c  I have personally reviewed this case and agree with the findings above: Electronically Signed: Castro Ramírez MD  8/16/2024 5:33 PM EDT  Workstation ID: [id number]    XR Abdomen KUB    Result Date: 8/16/2024  Impression: Small bowel obstruction. Electronically Signed: Dameon Chicas MD  8/16/2024 4:55 AM EDT  Workstation ID: [id number]    CT Angiogram Chest Pulmonary Embolism    Result Date: 8/15/2024  Impression: No evidence of pulmonary embolism. No acute intrathoracic findings. Multiple distended and mildly dilated gas and fluid-filled loops of mid jejunum without discrete transition point. There is a small amount of free fluid in the right lower quadrant which is presumably reactive to this process. Gas and a small amount of fluid/stool remains within the colon. The findings are compatible with early ileus versus partial small bowel obstruction. No evidence of bowel ischemia or perforation. Details above. There is evidence of emphysema. Correlate with  patient history and risk factors and please assess if the patient meets criteria for routine low dose CT lung cancer screening. Multiple scattered small pulmonary granulomata. Electronically Signed: Vaibhav Tapia MD  8/15/2024 11:59 AM EDT  Workstation ID: [id number]    PROBLEM LIST    Problem List Items Addressed This Visit          Pulmonary and Pneumonias    COPD (chronic obstructive pulmonary disease)       Sleep    PEDRO (obstructive sleep apnea)     Other Visit Diagnoses       Personal history of smoking    -  Primary    Relevant Orders    CT Chest Low Dose Wo              DISCUSSION    Mr. Ortega was here for initial valuation of a pulmonary nodule as well as CTA of the chest while he was hospitalized at [facility] For an ileus.  We did review his CT scans that were performed in 2020 and the findings are similar.  The nodule was measuring 9 mm and is partially calcified.  I do not think that this is anything to be concerned with.  They look similar to me when comparing the 2 scans.    He does qualify for a yearly CT screening for lung cancer.  Will go ahead and get this performed and I will contact him with results.  We followed these nodules previously for 2 full years.  I do not think he needs any other imaging for this pulmonary nodule.    We did review his PFTs in the office today and he does have a mild obstructive airway pattern.  He denies any breathing difficulties today in the office.  We will continue to follow these closely.    He has a history of PEDRO but is sleeping well currently.  If he has any sleeping problems in the future we will address this.    We will have him schedule a follow-up in 1 year with full PFTs.  I will contact him when I get the results of his low-dose CT screening.    I personally spent a total of 33 minutes on patient visit today including chart review, face to face with the patient obtaining the history and physical exam, review of pertinent images and tests,  counseling and discussion and/or coordination of care as described above, and documentation.  Total time excludes time spent on other separate services such as performing procedures or test interpretation, if applicable.        WILBER Hyatt  08/30/202415:06 EDT  Electronically signed     Please note that portions of this note were completed with a voice recognition program.        CC: Ronal Joe APRN

## 2025-08-06 ENCOUNTER — HOSPITAL ENCOUNTER (OUTPATIENT)
Facility: HOSPITAL | Age: 55
Discharge: HOME | End: 2025-08-06
Admitting: NURSE PRACTITIONER
Payer: COMMERCIAL

## 2025-08-06 DIAGNOSIS — Z87.891 PERSONAL HISTORY OF SMOKING: ICD-10-CM

## 2025-08-06 PROCEDURE — 71271 CT THORAX LUNG CANCER SCR C-: CPT
